# Patient Record
Sex: FEMALE | Race: WHITE | Employment: FULL TIME | URBAN - METROPOLITAN AREA
[De-identification: names, ages, dates, MRNs, and addresses within clinical notes are randomized per-mention and may not be internally consistent; named-entity substitution may affect disease eponyms.]

---

## 2018-02-05 ENCOUNTER — OFFICE VISIT (OUTPATIENT)
Dept: INTERNAL MEDICINE CLINIC | Age: 38
End: 2018-02-05

## 2018-02-05 VITALS
RESPIRATION RATE: 18 BRPM | OXYGEN SATURATION: 98 % | TEMPERATURE: 99.9 F | BODY MASS INDEX: 28.25 KG/M2 | SYSTOLIC BLOOD PRESSURE: 158 MMHG | WEIGHT: 180 LBS | HEIGHT: 67 IN | DIASTOLIC BLOOD PRESSURE: 108 MMHG | HEART RATE: 118 BPM

## 2018-02-05 DIAGNOSIS — F43.21 GRIEF: ICD-10-CM

## 2018-02-05 DIAGNOSIS — J06.9 VIRAL UPPER RESPIRATORY TRACT INFECTION: Primary | ICD-10-CM

## 2018-02-05 DIAGNOSIS — R50.9 FEVER, UNSPECIFIED FEVER CAUSE: ICD-10-CM

## 2018-02-05 LAB
FLUAV+FLUBV AG NOSE QL IA.RAPID: NEGATIVE POS/NEG
FLUAV+FLUBV AG NOSE QL IA.RAPID: NEGATIVE POS/NEG
VALID INTERNAL CONTROL?: YES

## 2018-02-05 RX ORDER — ALPRAZOLAM 0.25 MG/1
0.25 TABLET ORAL
Qty: 7 TAB | Refills: 0 | Status: SHIPPED | OUTPATIENT
Start: 2018-02-05 | End: 2018-05-15

## 2018-02-05 NOTE — PROGRESS NOTES
Kin Clemons is a 44 y.o. female who was seen in clinic today (2/5/2018). Assessment & Plan:   Diagnoses and all orders for this visit:    1. Viral upper respiratory tract infection- discussed diagnosis & treatment options, flu negative, discussed allergic vs viral vs bacterial etiologies and at this time favor a viral etiology, reviewed the importance of avoiding unnecessary antibiotic therapy, reviewed which OTC medications to use and avoid, expected time course for resolution & red flags were reviewed with her to RTC or notify me. 2. Fever, unspecified fever cause  -     AMB POC DEMOND INFLUENZA A/B TEST  ---> NEGATIVE     3. Grief- new dx, reviewed treatment options with her, recommended to see a counselor, reviewed life style changes to help improve mood, reviewed role of daily vs prn meds, following changes were made today: will start w/ meds below, 1/2 to 1 tab daily, she will update me in 1 wk with changes in mood, changes in BP, and changes in HR.     -     ALPRAZolam (XANAX) 0.25 mg tablet; Take 1 Tab by mouth nightly as needed for Anxiety. Max Daily Amount: 0.25 mg. Follow-up Disposition:  Return in about 2 weeks (around 2/19/2018), or if symptoms worsen or fail to improve, for Regular follow up. Subjective:   Marialuisa was seen today for Cold Symptoms    URI Review  Isaak Stacy returns to clinic today to talk about: ALAN symptoms for 2 days ago, which are unchanged since that time. She reports sore throat, dry cough, bilateral ear fullness, fevers up to 101 degrees, hoarseness, rhinorrhea, sinus congestion. Treatments have included: ibuprofen which have been not very effective. Relevant PMH: No pertinent additional PMH. Patient reports sick contacts: yes - was around someone recently diagnosed w/ flu. .       Father passed away 4 days ago. Was battling lung cancer. She is having trouble sleeping: dreams/thoughts of the hospital, he was on the ventilator and in the hospital for > 1 month. She is worried about her mother, had been  44+ years. She feels heart is racing at times. No chest pain or SOB. No abd pain, n/v.  No orthopnea or PND or LE edema. Prior to Admission medications    Not on File          Allergies   Allergen Reactions    Levaquin [Levofloxacin] Anaphylaxis    Oxycodone Rash           ROS - per HPI        Objective:   Physical Exam   Constitutional: No distress. HENT:   Right Ear: Tympanic membrane is not erythematous and not bulging. No middle ear effusion. Left Ear: Tympanic membrane is not erythematous and not bulging. No middle ear effusion. Nose: Mucosal edema and rhinorrhea present. Right sinus exhibits no maxillary sinus tenderness and no frontal sinus tenderness. Left sinus exhibits no maxillary sinus tenderness and no frontal sinus tenderness. Mouth/Throat: Uvula is midline and mucous membranes are normal. Posterior oropharyngeal erythema present. No oropharyngeal exudate. Eyes: Conjunctivae are normal. No scleral icterus. Neck: Neck supple. Cardiovascular: Regular rhythm and normal heart sounds. Tachycardia present. No murmur heard. Pulmonary/Chest: Effort normal and breath sounds normal. She has no wheezes. She has no rales. Lymphadenopathy:     She has no cervical adenopathy. Visit Vitals    BP (!) 158/108    Pulse (!) 118    Temp 99.9 °F (37.7 °C) (Oral)    Resp 18    Ht 5' 6.5\" (1.689 m)    Wt 180 lb (81.6 kg)    SpO2 98%    BMI 28.62 kg/m2         Disclaimer:  Advised her to call back or return to office if symptoms worsen/change/persist.  Discussed expected course/resolution/complications of diagnosis in detail with patient. Medication risks/benefits/costs/interactions/alternatives discussed with patient. She was given an after visit summary which includes diagnoses, current medications, & vitals. She expressed understanding with the diagnosis and plan.       Aspects of this note may have been generated using voice recognition software. Despite editing, there may be some syntax errors.        Divya Holley MD

## 2018-02-05 NOTE — PROGRESS NOTES
Fever 101 last night, sneezing, coughing. Took ibuprofen last night, not today. Father passed away 4 days ago. Having trouble sleeping. Has positive flu exposure. Flushed.

## 2018-02-05 NOTE — MR AVS SNAPSHOT
[address] [address] 
[phone] Patient: Hari Wiggins MRN: [id number] Visit Information Date & Time Provider Department Dept. Phone Encounter #  
 2/5/2018  3:15 PM Edyta Blanc, [facility] [phone] [id number] Follow-up Instructions Return in about 2 weeks (around 2/19/2018), or if symptoms worsen or fail to improve, for Regular follow up. Upcoming Health Maintenance Date Due  
 PAP AKA CERVICAL CYTOLOGY 1/10/2017 DTaP/Tdap/Td series (1 - Tdap) 9/29/2018* *Topic was postponed. The date shown is not the original due date. Allergies as of 2/5/2018  Review Complete On: 2/5/2018 By: Beba Huang RN Severity Noted Reaction Type Reactions Levaquin [Levofloxacin] High 07/10/2012    Anaphylaxis Oxycodone  11/04/2014    Rash Current Immunizations  Reviewed on 2/5/2018 Name Date PPD 1/1/2010 TD Vaccine 1/1/2010 Reviewed by Beba Huang RN on 2/5/2018 at  3:14 PM  
You Were Diagnosed With   
  
 Codes Comments Viral upper respiratory tract infection    -  Primary ICD-10-CM: J06.9, B97.89 ICD-9-CM: 465.9 Fever, unspecified fever cause     ICD-10-CM: R50.9 ICD-9-CM: 780.60 Grief     ICD-10-CM: N79.44 ICD-9-CM: 309.0 Vitals BP Pulse Temp Resp Height(growth percentile) Weight(growth percentile) (!) 158/108 (!) 118 99.9 °F (37.7 °C) (Oral) 18 5' 6.5\" (1.689 m) 180 lb (81.6 kg) SpO2 BMI OB Status Smoking Status 98% 28.62 kg/m2 Having regular periods Never Smoker Vitals History BMI and BSA Data Body Mass Index Body Surface Area  
 28.62 kg/m 2 1.96 m 2 Preferred Pharmacy Pharmacy Name Phone [id number]- [place], [address] [place] [address] [phone] Your Updated Medication List  
  
   
 This list is accurate as of: 2/5/18  3:55 PM.  Always use your most recent med list.  
  
  
  
  
 ALPRAZolam 0.25 mg tablet Commonly known as:  Bree Hams Take 1 Tab by mouth nightly as needed for Anxiety. Max Daily Amount: 0.25 mg.  
  
  
  
  
Prescriptions Printed Refills ALPRAZolam (XANAX) 0.25 mg tablet 0 Sig: Take 1 Tab by mouth nightly as needed for Anxiety. Max Daily Amount: 0.25 mg.  
 Class: Print Route: Oral  
  
We Performed the Following AMB POC DEMOND INFLUENZA A/B TEST [98457 CPT(R)] Follow-up Instructions Return in about 2 weeks (around 2/19/2018), or if symptoms worsen or fail to improve, for Regular follow up. Patient Instructions Grief (Actual/Anticipated): Care Instructions Your Care Instructions Grief is your emotional reaction to a major loss. The words \"sorrow\" and \"heartache\" often are used to describe feelings of grief. You feel grief when you lose a beloved person, pet, place, or thing. It is also natural to feel grief when you lose a valued way of life, such as a job, marriage, or good health. You may begin to grieve before a loss occurs. You may grieve for a loved one who is sick and dying. Children and adults often feel the pain of loss before a big move or divorce. This type of grief helps you get ready for a loss. Grief is different for each person. There is no \"normal\" or \"expected\" period of time for grieving. Some people adjust to their loss within a couple of months. Others may take 2 years or longer, especially if their lives were changed a lot or if the loss was sudden and shocking. Grieving can cause problems such as headaches, loss of appetite, and trouble with thinking or sleeping. You may withdraw from friends and family and behave in ways that are unusual for you. Grief may cause you to question your beliefs and views about life. Grief is natural and does not require medical treatment.  But if you have trouble sleeping, it may help to take sleeping pills for a short time. It may help to talk with people who have been through or are going through similar losses. You may also want to talk to a counselor about your feelings. Talking about your loss, sharing your cares and concerns, and getting support from others are important parts of healthy grieving. Follow-up care is a key part of your treatment and safety. Be sure to make and go to all appointments, and call your doctor if you are having problems. It's also a good idea to know your test results and keep a list of the medicines you take. How can you care for yourself at home? · Get enough sleep. Your mind helps make sense of your life while you sleep. Missing sleep can lead to illness and make it harder for you to deal with your grief. · Eat healthy foods. Try to avoid eating only foods that give you comfort. Ask someone to join you for a meal if you do not like eating alone. Consider taking a multivitamin every day. · Get some exercise every day. Even a walk can help you deal with your grief. Other exercises, such as yoga, can also help you manage stress. · Comfort yourself. Take time to look at photos or use special items that make you feel better. · Stay involved in your life. Do not withdraw from the activities you enjoy. People you know at work, Jainism, clubs, or other groups can help you get through your period of grief. · Think about joining a support group to help you deal with your grief. There are many support groups to help people recover from grief. When should you call for help? Call 911 anytime you think you may need emergency care. For example, call if: 
? · You feel you cannot stop from hurting yourself or someone else. ? Watch closely for changes in your health, and be sure to contact your doctor if: 
? · You think you may be depressed. ? · You do not get better as expected. Where can you learn more? Go to http://stephen-ned.info/. Enter H249 in the search box to learn more about \"Grief (Actual/Anticipated): Care Instructions. \" Current as of: September 24, 2016 Content Version: 11.4 © 6497-3398 [employer]. Care instructions adapted under license by [employer] (which disclaims liability or warranty for this information). If you have questions about a medical condition or this instruction, always ask your healthcare professional. [address] [address] any warranty or liability for your use of this information. Upper Respiratory Infection (Cold): Care Instructions Your Care Instructions An upper respiratory infection, or URI, is an infection of the nose, sinuses, or throat. URIs are spread by coughs, sneezes, and direct contact. The common cold is the most frequent kind of URI. The flu and sinus infections are other kinds of URIs. Almost all URIs are caused by viruses. Antibiotics won't cure them. But you can treat most infections with home care. This may include drinking lots of fluids and taking over-the-counter pain medicine. You will probably feel better in 4 to 10 days. The doctor has checked you carefully, but problems can develop later. If you notice any problems or new symptoms, get medical treatment right away. Follow-up care is a key part of your treatment and safety. Be sure to make and go to all appointments, and call your doctor if you are having problems. It's also a good idea to know your test results and keep a list of the medicines you take. How can you care for yourself at home? · To prevent dehydration, drink plenty of fluids, enough so that your urine is light yellow or clear like water. Choose water and other caffeine-free clear liquids until you feel better. If you have kidney, heart, or liver disease and have to limit fluids, talk with your doctor before you increase the amount of fluids you drink. · Take an over-the-counter pain medicine, such as acetaminophen (Tylenol), ibuprofen (Advil, Motrin), or naproxen (Aleve). Read and follow all instructions on the label. · Before you use cough and cold medicines, check the label. These medicines may not be safe for young children or for people with certain health problems. · Be careful when taking over-the-counter cold or flu medicines and Tylenol at the same time. Many of these medicines have acetaminophen, which is Tylenol. Read the labels to make sure that you are not taking more than the recommended dose. Too much acetaminophen (Tylenol) can be harmful. · Get plenty of rest. 
· Do not smoke or allow others to smoke around you. If you need help quitting, talk to your doctor about stop-smoking programs and medicines. These can increase your chances of quitting for good. When should you call for help? Call 911 anytime you think you may need emergency care. For example, call if: 
? · You have severe trouble breathing. ?Call your doctor now or seek immediate medical care if: 
? · You seem to be getting much sicker. ? · You have new or worse trouble breathing. ? · You have a new or higher fever. ? · You have a new rash. ? Watch closely for changes in your health, and be sure to contact your doctor if: 
? · You have a new symptom, such as a sore throat, an earache, or sinus pain. ? · You cough more deeply or more often, especially if you notice more mucus or a change in the color of your mucus. ? · You do not get better as expected. Where can you learn more? Go to http://stephen-ned.info/. Enter Z516 in the search box to learn more about \"Upper Respiratory Infection (Cold): Care Instructions. \" Current as of: May 12, 2017 Content Version: 11.4 © 5824-8241 Healthwise, [employer]  Care instructions adapted under license by [employer] (which disclaims liability or warranty for this information). If you have questions about a medical condition or this instruction, always ask your healthcare professional. [address] any warranty or liability for your use of this information. Introducing [facility] SERVICES! Dear Calos Sorensen: Thank you for requesting a [employer] account. Our records indicate that you have previously registered for a [employer] account but its currently inactive. Please call our [employer] support line at [phone]. Additional Information If you have questions, please visit the Frequently Asked Questions section of the [employer] website at [email or web address] Remember, [employer] is NOT to be used for urgent needs. For medical emergencies, dial 911. Now available from your iPhone and Android! Please provide this summary of care documentation to your next provider. Your primary care clinician is listed as Kishor Segura. If you have any questions after today's visit, please call [phone].

## 2018-02-05 NOTE — PATIENT INSTRUCTIONS
Grief (Actual/Anticipated): Care Instructions  Your Care Instructions    Grief is your emotional reaction to a major loss. The words \"sorrow\" and \"heartache\" often are used to describe feelings of grief. You feel grief when you lose a beloved person, pet, place, or thing. It is also natural to feel grief when you lose a valued way of life, such as a job, marriage, or good health. You may begin to grieve before a loss occurs. You may grieve for a loved one who is sick and dying. Children and adults often feel the pain of loss before a big move or divorce. This type of grief helps you get ready for a loss. Grief is different for each person. There is no \"normal\" or \"expected\" period of time for grieving. Some people adjust to their loss within a couple of months. Others may take 2 years or longer, especially if their lives were changed a lot or if the loss was sudden and shocking. Grieving can cause problems such as headaches, loss of appetite, and trouble with thinking or sleeping. You may withdraw from friends and family and behave in ways that are unusual for you. Grief may cause you to question your beliefs and views about life. Grief is natural and does not require medical treatment. But if you have trouble sleeping, it may help to take sleeping pills for a short time. It may help to talk with people who have been through or are going through similar losses. You may also want to talk to a counselor about your feelings. Talking about your loss, sharing your cares and concerns, and getting support from others are important parts of healthy grieving. Follow-up care is a key part of your treatment and safety. Be sure to make and go to all appointments, and call your doctor if you are having problems. It's also a good idea to know your test results and keep a list of the medicines you take. How can you care for yourself at home? · Get enough sleep. Your mind helps make sense of your life while you sleep. Missing sleep can lead to illness and make it harder for you to deal with your grief. · Eat healthy foods. Try to avoid eating only foods that give you comfort. Ask someone to join you for a meal if you do not like eating alone. Consider taking a multivitamin every day. · Get some exercise every day. Even a walk can help you deal with your grief. Other exercises, such as yoga, can also help you manage stress. · Comfort yourself. Take time to look at photos or use special items that make you feel better. · Stay involved in your life. Do not withdraw from the activities you enjoy. People you know at work, Taoist, clubs, or other groups can help you get through your period of grief. · Think about joining a support group to help you deal with your grief. There are many support groups to help people recover from grief. When should you call for help? Call 911 anytime you think you may need emergency care. For example, call if:  ? · You feel you cannot stop from hurting yourself or someone else. ? Watch closely for changes in your health, and be sure to contact your doctor if:  ? · You think you may be depressed. ? · You do not get better as expected. Where can you learn more? Go to http://stephen-ned.info/. Enter H249 in the search box to learn more about \"Grief (Actual/Anticipated): Care Instructions. \"  Current as of: September 24, 2016  Content Version: 11.4  © 4996-7445 [employer]. Care instructions adapted under license by [employer] (which disclaims liability or warranty for this information). If you have questions about a medical condition or this instruction, always ask your healthcare professional. [address] [address] any warranty or liability for your use of this information.        Upper Respiratory Infection (Cold): Care Instructions  Your Care Instructions    An upper respiratory infection, or URI, is an infection of the nose, sinuses, or throat. URIs are spread by coughs, sneezes, and direct contact. The common cold is the most frequent kind of URI. The flu and sinus infections are other kinds of URIs. Almost all URIs are caused by viruses. Antibiotics won't cure them. But you can treat most infections with home care. This may include drinking lots of fluids and taking over-the-counter pain medicine. You will probably feel better in 4 to 10 days. The doctor has checked you carefully, but problems can develop later. If you notice any problems or new symptoms, get medical treatment right away. Follow-up care is a key part of your treatment and safety. Be sure to make and go to all appointments, and call your doctor if you are having problems. It's also a good idea to know your test results and keep a list of the medicines you take. How can you care for yourself at home? · To prevent dehydration, drink plenty of fluids, enough so that your urine is light yellow or clear like water. Choose water and other caffeine-free clear liquids until you feel better. If you have kidney, heart, or liver disease and have to limit fluids, talk with your doctor before you increase the amount of fluids you drink. · Take an over-the-counter pain medicine, such as acetaminophen (Tylenol), ibuprofen (Advil, Motrin), or naproxen (Aleve). Read and follow all instructions on the label. · Before you use cough and cold medicines, check the label. These medicines may not be safe for young children or for people with certain health problems. · Be careful when taking over-the-counter cold or flu medicines and Tylenol at the same time. Many of these medicines have acetaminophen, which is Tylenol. Read the labels to make sure that you are not taking more than the recommended dose. Too much acetaminophen (Tylenol) can be harmful. · Get plenty of rest.  · Do not smoke or allow others to smoke around you.  If you need help quitting, talk to your doctor about stop-smoking programs and medicines. These can increase your chances of quitting for good. When should you call for help? Call 911 anytime you think you may need emergency care. For example, call if:  ? · You have severe trouble breathing. ?Call your doctor now or seek immediate medical care if:  ? · You seem to be getting much sicker. ? · You have new or worse trouble breathing. ? · You have a new or higher fever. ? · You have a new rash. ? Watch closely for changes in your health, and be sure to contact your doctor if:  ? · You have a new symptom, such as a sore throat, an earache, or sinus pain. ? · You cough more deeply or more often, especially if you notice more mucus or a change in the color of your mucus. ? · You do not get better as expected. Where can you learn more? Go to http://stephen-ned.info/. Enter X389 in the search box to learn more about \"Upper Respiratory Infection (Cold): Care Instructions. \"  Current as of: May 12, 2017  Content Version: 11.4  © 6538-1091 Healthwise, [employer]. Care instructions adapted under license by [employer] (which disclaims liability or warranty for this information). If you have questions about a medical condition or this instruction, always ask your healthcare professional. [address] any warranty or liability for your use of this information.

## 2018-03-30 ENCOUNTER — OFFICE VISIT (OUTPATIENT)
Dept: SURGERY | Age: 38
End: 2018-03-30

## 2018-03-30 ENCOUNTER — DOCUMENTATION ONLY (OUTPATIENT)
Dept: SURGERY | Age: 38
End: 2018-03-30

## 2018-03-30 VITALS
HEIGHT: 66 IN | HEART RATE: 119 BPM | BODY MASS INDEX: 29.41 KG/M2 | SYSTOLIC BLOOD PRESSURE: 154 MMHG | WEIGHT: 183 LBS | DIASTOLIC BLOOD PRESSURE: 107 MMHG

## 2018-03-30 DIAGNOSIS — D05.11 DUCTAL CARCINOMA IN SITU (DCIS) OF RIGHT BREAST: Primary | ICD-10-CM

## 2018-03-30 NOTE — PROGRESS NOTES
Patient did not bring a copy of her mammogram films to her appointment. She will have a breast MRI next week. I advised her to bring a copy of the mammogram films to the breast MRI when she has it done. She understood.

## 2018-03-30 NOTE — PATIENT INSTRUCTIONS
Breast Cancer: Care Instructions  Your Care Instructions    Breast cancer occurs when abnormal cells grow out of control in the breast. These cancer cells can spread within the breast, to nearby lymph nodes and other tissues, and to other parts of the body. Being treated for cancer can weaken your body, and you may feel very tired. Get the rest your body needs so you can feel better. Finding out that you have cancer is scary. You may feel many emotions and may need some help coping. Seek out family, friends, and counselors for support. You also can do things at home to make yourself feel better while you go through treatment. Call the [employer] (2-512.327.1530) or visit its website at [employer] for more information. Follow-up care is a key part of your treatment and safety. Be sure to make and go to all appointments, and call your doctor if you are having problems. It's also a good idea to know your test results and keep a list of the medicines you take. How can you care for yourself at home? · Take your medicines exactly as prescribed. Call your doctor if you think you are having a problem with your medicine. You may get medicine for nausea and vomiting if you have these side effects. · Follow your doctor's instructions to relieve pain. Pain from cancer and surgery can almost always be controlled. Use pain medicine when you first notice pain, before it becomes severe. · Eat healthy food. If you do not feel like eating, try to eat food that has protein and extra calories to keep up your strength and prevent weight loss. Drink liquid meal replacements for extra calories and protein. Try to eat your main meal early. · Get some physical activity every day, but do not get too tired. Keep doing the hobbies you enjoy as your energy allows. · Do not smoke. Smoking can make your cancer worse. If you need help quitting, talk to your doctor about stop-smoking programs and medicines.  These can increase your chances of quitting for good. · Take steps to control your stress and workload. Learn relaxation techniques. ¨ Share your feelings. Stress and tension affect our emotions. By expressing your feelings to others, you may be able to understand and cope with them. ¨ Consider joining a support group. Talking about a problem with your spouse, a good friend, or other people with similar problems is a good way to reduce tension and stress. ¨ Express yourself through art. Try writing, crafts, dance, or art to relieve stress. Some dance, writing, or art groups may be available just for people who have cancer. ¨ Be kind to your body and mind. Getting enough sleep, eating a healthy diet, and taking time to do things you enjoy can contribute to an overall feeling of balance in your life and can help reduce stress. ¨ Get help if you need it. Discuss your concerns with your doctor or counselor. · If you are vomiting or have diarrhea:  ¨ Drink plenty of fluids (enough so that your urine is light yellow or clear like water) to prevent dehydration. Choose water and other caffeine-free clear liquids. If you have kidney, heart, or liver disease and have to limit fluids, talk with your doctor before you increase the amount of fluids you drink. ¨ When you are able to eat, try clear soups, mild foods, and liquids until all symptoms are gone for 12 to 48 hours. Other good choices include dry toast, crackers, cooked cereal, and gelatin dessert, such as Jell-O.  · If you have not already done so, prepare a list of advance directives. Advance directives are instructions to your doctor and family members about what kind of care you want if you become unable to speak or express yourself. When should you call for help? Call 911 anytime you think you may need emergency care. For example, call if:  ? · You passed out (lost consciousness). ?Call your doctor now or seek immediate medical care if:  ? · You have a fever. ? · You have abnormal bleeding. ? · You think you have an infection. ? · You have new or worse pain. ? · You have new symptoms, such as a cough, belly pain, vomiting, diarrhea, or a rash. ? Watch closely for changes in your health, and be sure to contact your doctor if:  ? · You are much more tired than usual.   ? · You have swollen glands in your armpits, groin, or neck. ? · You do not get better as expected. Where can you learn more? Go to http://stephen-ned.info/. Enter V321 in the search box to learn more about \"Breast Cancer: Care Instructions. \"  Current as of: May 12, 2017  Content Version: 11.4  © 9484-0591 [employer]. Care instructions adapted under license by [employer] (which disclaims liability or warranty for this information). If you have questions about a medical condition or this instruction, always ask your healthcare professional. [address] any warranty or liability for your use of this information.

## 2018-03-30 NOTE — MR AVS SNAPSHOT
[address] [facility] 
[phone] Patient: Andrea Alcocer MRN: [id number] Visit Information Date & Time Provider Department Dept. Phone Encounter #  
 3/30/2018  1:00 PM Lamberto Randall  [address] [id number] Your Appointments 3/30/2018  1:00 PM  
BREAST TALK 30 with MD [address] [facility] Appt Note: dcis 2 site right/ imaging at [facility]/ dr chaudhary/ cp$ 3-22-18 [phone]  
[address] [place] [address] Upcoming Health Maintenance Date Due DTaP/Tdap/Td series (1 - Tdap) 9/29/2018* PAP AKA CERVICAL CYTOLOGY 2/10/2020 *Topic was postponed. The date shown is not the original due date. Allergies as of 3/30/2018  Review Complete On: 2/5/2018 By: Maci Osborn MD  
  
 Severity Noted Reaction Type Reactions Levaquin [Levofloxacin] High 07/10/2012    Anaphylaxis Oxycodone  11/04/2014    Rash Current Immunizations  Reviewed on 2/5/2018 Name Date PPD 1/1/2010 TD Vaccine 1/1/2010 Not reviewed this visit Vitals OB Status Smoking Status Having regular periods Never Smoker Preferred Pharmacy Pharmacy Name Phone [phone] [phone] [phone] Your Updated Medication List  
  
   
This list is accurate as of 3/30/18 12:50 PM.  Always use your most recent med list.  
  
  
  
  
 ALPRAZolam 0.25 mg tablet Commonly known as:  Charlee Jordan Take 1 Tab by mouth nightly as needed for Anxiety. Max Daily Amount: 0.25 mg. Introducing [facility]!    
 Kenya Stewart introduces [employer] patient portal. Now you can access parts of your medical record, email your doctor's office, and request medication refills online. 1. In your internet browser, go to [email or web address] [email or web address] 2. Click on the First Time User? Click Here link in the Sign In box. You will see the New Member Sign Up page. 3. Enter your [employer] Access Code exactly as it appears below. You will not need to use this code after youve completed the sign-up process. If you do not sign up before the expiration date, you must request a new code. · [employer] Access Code: [email or web address] Expires: 6/28/2018 12:32 PM 
 
4. Enter the last four digits of your Social Security Number (xxxx) and Date of Birth (mm/dd/yyyy) as indicated and click Submit. You will be taken to the next sign-up page. 5. Create a [employer]jogo ID. This will be your [employer] login ID and cannot be changed, so think of one that is secure and easy to remember. 6. Create a [employer] password. You can change your password at any time. 7. Enter your Password Reset Question and Answer. This can be used at a later time if you forget your password. 8. Enter your e-mail address. You will receive e-mail notification when new information is available in [address] 9. Click Sign Up. You can now view and download portions of your medical record. 10. Click the Download Summary menu link to download a portable copy of your medical information. If you have questions, please visit the Frequently Asked Questions section of the [employer] website. Remember, [employer] is NOT to be used for urgent needs. For medical emergencies, dial 911. Now available from your iPhone and Android! Please provide this summary of care documentation to your next provider. Your primary care clinician is listed as Sanjiv Royal. If you have any questions after today's visit, please call [phone].

## 2018-03-30 NOTE — PROGRESS NOTES
HISTORY OF PRESENT ILLNESS  Sofia Reed is a 44 y.o. female. HPI  NEW patient consult referred by Dr. Carmen Estevez for 2 site RIGHT breast DCIS. Had been feeling pain to her RIGHT breast. No pain today. Does not palpate a breast lump. Cancer found on screening mammogram.  Denies skin changes or nipple discharge/retraction. Denies FH of breast or ovarian cancer. Mammogram/ultrasound, VWC, BIRADS 4C multiple irregular hypoechoic masses from 9:00 to 10:00   Largest 2.9 cm  10:00 4 cfn and 10 cfn DCIS high grade with comedonecrosis. Er/Pr negative  Past Medical History:   Diagnosis Date    Calculus of kidney     NVD (normal vaginal delivery) 2017    Overweight(278.02) 2013     No past surgical history on file. Social History     Social History    Marital status:      Spouse name: N/A    Number of children: N/A    Years of education: N/A     Occupational History    Not on file. Social History Main Topics    Smoking status: Never Smoker    Smokeless tobacco: Never Used    Alcohol use 1.0 oz/week     2 Glasses of wine per week    Drug use: No    Sexual activity: Yes     Partners: Male     Birth control/ protection: None, Condom     Other Topics Concern    Not on file     Social History Narrative     OB History      Para Term  AB Living    2 2    2    SAB TAB Ectopic Molar Multiple Live Births                 Obstetric Comments    Menarche:  11. LMP:3/8/18 # of Children:  2. Age at Delivery of First Child: 31 .   Hysterectomy/oophorectomy:  NO/NO. Breast Bx:  yes. Hx of Breast Feeding:  yes. BCP:  NO. Hormone therapy:  no.           Current Outpatient Prescriptions:     ALPRAZolam (XANAX) 0.25 mg tablet, Take 1 Tab by mouth nightly as needed for Anxiety. Max Daily Amount: 0.25 mg., Disp: 7 Tab, Rfl: 0  Allergies   Allergen Reactions    Levaquin [Levofloxacin] Anaphylaxis    Oxycodone Rash         Review of Systems   Constitutional: Negative. HENT: Negative. Eyes: Negative. Respiratory: Negative. Cardiovascular: Negative. Gastrointestinal: Negative. Genitourinary: Negative. Musculoskeletal: Negative. Skin: Negative. Neurological: Negative. Endo/Heme/Allergies: Negative. Psychiatric/Behavioral: Negative. Physical Exam   Pulmonary/Chest: Right breast exhibits mass. Right breast exhibits no inverted nipple, no nipple discharge, no skin change and no tenderness. Left breast exhibits no inverted nipple, no mass, no nipple discharge, no skin change and no tenderness. Breasts are symmetrical.       Lymphadenopathy:     She has no cervical adenopathy. She has no axillary adenopathy. Nursing note and vitals reviewed. ASSESSMENT and PLAN    ICD-10-CM ICD-9-CM    1. Ductal carcinoma in situ (DCIS) of right breast D05.11 233.0 MRI BREAST BI W WO CONT     45 yo female with multifocal dcis er/pr negative, high grade with comedonecrosis. She is here with her . I have reviewed the imaging and pathology with her and she was given copies of these reports. 90 minutes were spent face-to-face with the patient during this encounter and 90% of that time was spent on counseling and coordination of care. 1. Discussed lumpectomy and radiation vs mastectomy. Discussed reconstruction. MRI ordered to see if patient is a candidate for a lumpectomy. Will likely need mastectomy with reconstruction. 2. Discussed sentinel lymph node biopsy. 3. Discussed external beam radiation. 4. Discussed hormone therapy. 5. Discussed the possibility of chemotherapy. 6. Discussed genetic testing. Patient wants to think about this. Will schedule mri and refer to plastic surgery. She wants things done at [facility].

## 2018-03-30 NOTE — LETTER
3/30/2018 4:49 PM 
 
Patient:  Iza Holguin YOB: 1980 Date of Visit: 3/30/2018 Dear Ana Laura Hilton MD 
[address] [facility] [address] VIA In Basket 
 : Thank you for referring Ms. Iza Holguin to me for evaluation/treatment. Below are the relevant portions of my assessment and plan of care. HISTORY OF PRESENT ILLNESS Iza Holguin is a 44 y.o. female. HPI  NEW patient consult referred by Dr. Iona Long for 2 site RIGHT breast DCIS. Had been feeling pain to her RIGHT breast. No pain today. Does not palpate a breast lump. Cancer found on screening mammogram.  Denies skin changes or nipple discharge/retraction. Denies FH of breast or ovarian cancer. Mammogram/ultrasound, VWC, BIRADS 4C multiple irregular hypoechoic masses from 9:00 to 10:00 Largest 2.9 cm 10:00 4 cfn and 10 cfn DCIS high grade with comedonecrosis. Er/Pr negative Past Medical History:  
Diagnosis Date  Calculus of kidney   NVD (normal vaginal delivery) 2017  Overweight(278.02) 2013 No past surgical history on file. Social History Social History  Marital status:  Spouse name: N/A  
 Number of children: N/A  
 Years of education: N/A Occupational History  Not on file. Social History Main Topics  Smoking status: Never Smoker  Smokeless tobacco: Never Used  Alcohol use 1.0 oz/week 2 Glasses of wine per week  Drug use: No  
 Sexual activity: Yes  
  Partners: Male Birth control/ protection: None, Condom Other Topics Concern  Not on file Social History Narrative OB History  Para Term  AB Living 2 2    2 SAB TAB Ectopic Molar Multiple Live Births Obstetric Comments Menarche:  14. LMP:3/8/18 # of Children:  2. Age at Delivery of First Child: 31 .   Hysterectomy/oophorectomy:  NO/NO. Breast Bx:  yes.   Hx of Breast Feeding:  yes. BCP:  NO. Hormone therapy:  no.   
  
 
 
Current Outpatient Prescriptions:  
  ALPRAZolam (XANAX) 0.25 mg tablet, Take 1 Tab by mouth nightly as needed for Anxiety. Max Daily Amount: 0.25 mg., Disp: 7 Tab, Rfl: 0 Allergies Allergen Reactions  Levaquin [Levofloxacin] Anaphylaxis  Oxycodone Rash Review of Systems Constitutional: Negative. HENT: Negative. Eyes: Negative. Respiratory: Negative. Cardiovascular: Negative. Gastrointestinal: Negative. Genitourinary: Negative. Musculoskeletal: Negative. Skin: Negative. Neurological: Negative. Endo/Heme/Allergies: Negative. Psychiatric/Behavioral: Negative. Physical Exam  
Pulmonary/Chest: Right breast exhibits mass. Right breast exhibits no inverted nipple, no nipple discharge, no skin change and no tenderness. Left breast exhibits no inverted nipple, no mass, no nipple discharge, no skin change and no tenderness. Breasts are symmetrical.  
 
 
Lymphadenopathy:  
  She has no cervical adenopathy. She has no axillary adenopathy. Nursing note and vitals reviewed. ASSESSMENT and PLAN 
  ICD-10-CM ICD-9-CM 1. Ductal carcinoma in situ (DCIS) of right breast D05.11 233.0 MRI BREAST BI W WO CONT  
 
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 She is here with her . I have reviewed the imaging and pathology with her and she was given copies of these reports. 90 minutes were spent face-to-face with the patient during this encounter and 90% of that time was spent on counseling and coordination of care. 1. Discussed lumpectomy and radiation vs mastectomy. Discussed reconstruction. MRI ordered to see if patient is a candidate for a lumpectomy. Will likely need mastectomy with reconstruction. 2. Discussed sentinel lymph node biopsy. 3. Discussed external beam radiation. 4. Discussed hormone therapy. 5. Discussed the possibility of chemotherapy. 6. Discussed genetic testing. Patient wants to think about this. Will schedule mri and refer to plastic surgery. She wants things done at [facility]. If you have questions, please do not hesitate to call me. I look forward to following Ms. Salazar along with you. Sincerely, Blair Chaves MD

## 2018-03-30 NOTE — COMMUNICATION BODY
HISTORY OF PRESENT ILLNESS  Deshaun Bhatti is a 44 y.o. female. HPI  NEW patient consult referred by Dr. Willow Salcedo for 2 site RIGHT breast DCIS. Had been feeling pain to her RIGHT breast. No pain today. Does not palpate a breast lump. Cancer found on screening mammogram.  Denies skin changes or nipple discharge/retraction. Denies FH of breast or ovarian cancer. Mammogram/ultrasound, VWC, BIRADS 4C multiple irregular hypoechoic masses from 9:00 to 10:00   Largest 2.9 cm  10:00 4 cfn and 10 cfn DCIS high grade with comedonecrosis. Er/Pr negative  Past Medical History:   Diagnosis Date    Calculus of kidney     NVD (normal vaginal delivery) 2017    Overweight(278.02) 2013     No past surgical history on file. Social History     Social History    Marital status:      Spouse name: N/A    Number of children: N/A    Years of education: N/A     Occupational History    Not on file. Social History Main Topics    Smoking status: Never Smoker    Smokeless tobacco: Never Used    Alcohol use 1.0 oz/week     2 Glasses of wine per week    Drug use: No    Sexual activity: Yes     Partners: Male     Birth control/ protection: None, Condom     Other Topics Concern    Not on file     Social History Narrative     OB History      Para Term  AB Living    2 2    2    SAB TAB Ectopic Molar Multiple Live Births                 Obstetric Comments    Menarche:  11. LMP:3/8/18 # of Children:  2. Age at Delivery of First Child: 31 .   Hysterectomy/oophorectomy:  NO/NO. Breast Bx:  yes. Hx of Breast Feeding:  yes. BCP:  NO. Hormone therapy:  no.           Current Outpatient Prescriptions:     ALPRAZolam (XANAX) 0.25 mg tablet, Take 1 Tab by mouth nightly as needed for Anxiety. Max Daily Amount: 0.25 mg., Disp: 7 Tab, Rfl: 0  Allergies   Allergen Reactions    Levaquin [Levofloxacin] Anaphylaxis    Oxycodone Rash         Review of Systems   Constitutional: Negative. HENT: Negative. Eyes: Negative. Respiratory: Negative. Cardiovascular: Negative. Gastrointestinal: Negative. Genitourinary: Negative. Musculoskeletal: Negative. Skin: Negative. Neurological: Negative. Endo/Heme/Allergies: Negative. Psychiatric/Behavioral: Negative. Physical Exam   Pulmonary/Chest: Right breast exhibits mass. Right breast exhibits no inverted nipple, no nipple discharge, no skin change and no tenderness. Left breast exhibits no inverted nipple, no mass, no nipple discharge, no skin change and no tenderness. Breasts are symmetrical.       Lymphadenopathy:     She has no cervical adenopathy. She has no axillary adenopathy. Nursing note and vitals reviewed. ASSESSMENT and PLAN    ICD-10-CM ICD-9-CM    1. Ductal carcinoma in situ (DCIS) of right breast D05.11 233.0 MRI BREAST BI W WO CONT     43 yo female with multifocal dcis er/pr negative, high grade with comedonecrosis. She is here with her . I have reviewed the imaging and pathology with her and she was given copies of these reports. 90 minutes were spent face-to-face with the patient during this encounter and 90% of that time was spent on counseling and coordination of care. 1. Discussed lumpectomy and radiation vs mastectomy. Discussed reconstruction. MRI ordered to see if patient is a candidate for a lumpectomy. Will likely need mastectomy with reconstruction. 2. Discussed sentinel lymph node biopsy. 3. Discussed external beam radiation. 4. Discussed hormone therapy. 5. Discussed the possibility of chemotherapy. 6. Discussed genetic testing. Patient wants to think about this. Will schedule mri and refer to plastic surgery. She wants things done at [facility].

## 2018-04-02 ENCOUNTER — TELEPHONE (OUTPATIENT)
Dept: SURGERY | Age: 38
End: 2018-04-02

## 2018-04-02 NOTE — TELEPHONE ENCOUNTER
Per Dr. Itz Smith, I faxed slide request to [facility] path to be sent to [facility] for 2nd opinion pathology read, per request from the patient's . Called Mr. Simeonfern Adele to notify him that I had done this.

## 2018-04-02 NOTE — TELEPHONE ENCOUNTER
----- Message from Main Chen MD sent at 4/2/2018  1:52 PM EDT -----  Regarding: RE: path 2nd opinion  [id number] [address]  ----- Message -----     From: Luis Alberto Patino RN     Sent: 4/2/2018   1:36 PM       To: Main Chen MD  Subject: path 2nd opinion                                 Patient's  interested in getting 2nd opinion pathology read on the biopsy which was done at [facility] (went to [facility] path). Should I request slides to be sent to our lab for 2nd opinion?   Thanks,  Mary Adkins

## 2018-04-03 DIAGNOSIS — D05.11 DUCTAL CARCINOMA IN SITU (DCIS) OF RIGHT BREAST: Primary | ICD-10-CM

## 2018-04-04 ENCOUNTER — HOSPITAL ENCOUNTER (OUTPATIENT)
Dept: LAB | Age: 38
Discharge: HOME | End: 2018-04-04

## 2018-04-04 NOTE — PROGRESS NOTES
I spoke w/Dolores, medical records w/[facility], she advised that she will attempt to find someone to \"push\" the images this afternoon. If unable to \"push\" this evening, will have the regular medical record person who handles this do so in the morning. I advised that Ms. Edinson Hale will be here @ 830 for MRI breast, supplied w/contact number of [phone].

## 2018-04-05 ENCOUNTER — HOSPITAL ENCOUNTER (OUTPATIENT)
Dept: MRI IMAGING | Age: 38
Discharge: HOME | End: 2018-04-05
Attending: SURGERY
Payer: COMMERCIAL

## 2018-04-05 DIAGNOSIS — D05.11 DUCTAL CARCINOMA IN SITU (DCIS) OF RIGHT BREAST: ICD-10-CM

## 2018-04-05 PROCEDURE — 77059 MRI BREAST BI W WO CONT: CPT

## 2018-04-05 PROCEDURE — A9585 GADOBUTROL INJECTION: HCPCS | Performed by: SURGERY

## 2018-04-05 PROCEDURE — 74011250636 HC RX REV CODE- 250/636: Performed by: SURGERY

## 2018-04-05 RX ADMIN — GADOBUTROL 9.5 ML: 604.72 INJECTION INTRAVENOUS at 09:20

## 2018-04-05 NOTE — PROGRESS NOTES
Nya Ozuna, [address] [facility], contacted in reference to [facility] that was completed this a.m. June linked all reports at this time.

## 2018-04-09 ENCOUNTER — TELEPHONE (OUTPATIENT)
Dept: SURGERY | Age: 38
End: 2018-04-09

## 2018-04-09 NOTE — TELEPHONE ENCOUNTER
I spoke with patient about breast mri. Needs mastectomy right breast. Left breast ok. Pathology review pending. I called dr. Andrzej Lo to move up her appointment.

## 2018-04-12 NOTE — TELEPHONE ENCOUNTER
I called Santos Buford Hamman and gave him the path review information. He is asking about sooner date with plastic surgeon. I called Bre Brambila and apparently Yary Mtz has not moved date sooner than original date of 4/25/17 because Dr. Elder Berg is out of the office this week.

## 2018-04-12 NOTE — TELEPHONE ENCOUNTER
----- Message from Natividad Dallas MD sent at 4/12/2018  1:15 PM EDT -----  Regarding: RE:  calling  Can you call him? We also read as high grade dcis. Same as outside. If he wants to talk to me I can call tomorrow. Tell him I am in operating room today. Plan still mastectomy on right with recon. Not sure when plastics appointment got moved up to with aboutanos. thanks  ----- Message -----     From: Sheridan Rutledge RN     Sent: 4/12/2018  11:57 AM       To: Natividad Dallas MD  Subject:  calling                                  [place],    Can you please call Mr. Jesi Angeles with the results of the pathology review? His phone # [phone].     Thank you,  Monik Silva

## 2018-04-13 NOTE — TELEPHONE ENCOUNTER
Dr Elder Berg appointment has been moved up to April 16, 2018 @ 2:45 pm    [facility] [place] [phone]    Patient was very excited to get this appointment.

## 2018-05-01 DIAGNOSIS — D05.11 DUCTAL CARCINOMA IN SITU OF RIGHT BREAST: Primary | ICD-10-CM

## 2018-05-03 ENCOUNTER — DOCUMENTATION ONLY (OUTPATIENT)
Dept: SURGERY | Age: 38
End: 2018-05-03

## 2018-05-03 NOTE — PROGRESS NOTES
PATIENT'S  CALLED AND LEFT MESSAGE, WANTING LETTER OF INSTRUCTIONS FOR SURGERY TO BE MAILED TO HOME ADDRESS. COMPLETED AND MAILED ON 5-3-18.

## 2018-05-08 ENCOUNTER — TELEPHONE (OUTPATIENT)
Dept: SURGERY | Age: 38
End: 2018-05-08

## 2018-05-08 NOTE — TELEPHONE ENCOUNTER
Patient's  L/M on nurse voicemail asking that he be called regarding patient's breast MRI. Is wanting to know if there is anything else showing on the breast MRI other than the known DCIS. I will ask Dr. Charity Motley to call the patient's  to clarify.

## 2018-05-10 ENCOUNTER — TELEPHONE (OUTPATIENT)
Dept: SURGERY | Age: 38
End: 2018-05-10

## 2018-05-10 NOTE — TELEPHONE ENCOUNTER
I spent 25 minutes on the phone with the patient's , answering his questions pertaining to date/time/location of pat, slnb injection, surgery. Answered questions about breast mri and need for mastectomy. Discussed xanax with him. Pt's PCP Dr. Kirt Durham presribed to Ms. Salazar in January when her father . Mr. All Austin said that [facility] has not been taking them. I encouraged him to talk to her about trying it, as Dr. Vaishali Torres usually prescribes xanax during the time of high anxiety for patients. They just cannot wait until surgery is over. I offered them an appt with Dr. Vaishali Torres if they have more questions and want to come in to chat.

## 2018-05-15 ENCOUNTER — ANESTHESIA EVENT (OUTPATIENT)
Dept: MEDSURG UNIT | Age: 38
End: 2018-05-15
Payer: COMMERCIAL

## 2018-05-15 ENCOUNTER — HOSPITAL ENCOUNTER (OUTPATIENT)
Dept: PREADMISSION TESTING | Age: 38
Discharge: HOME | End: 2018-05-15
Payer: COMMERCIAL

## 2018-05-15 VITALS
DIASTOLIC BLOOD PRESSURE: 95 MMHG | HEIGHT: 66 IN | SYSTOLIC BLOOD PRESSURE: 160 MMHG | WEIGHT: 183 LBS | HEART RATE: 82 BPM | TEMPERATURE: 98 F | BODY MASS INDEX: 29.41 KG/M2

## 2018-05-15 PROCEDURE — 85025 COMPLETE CBC W/AUTO DIFF WBC: CPT | Performed by: SURGERY

## 2018-05-15 PROCEDURE — 80048 BASIC METABOLIC PNL TOTAL CA: CPT | Performed by: SURGERY

## 2018-05-15 RX ORDER — IBUPROFEN 200 MG
400 TABLET ORAL
COMMUNITY
End: 2018-05-23

## 2018-05-15 NOTE — PERIOP NOTES
PATIENT GIVEN SURGICAL SITE INFECTION FAQ HANDOUT AND HAND WASHING TIP SHEET. PREOP INSTRUCTIONS REVIEWED AND PATIENT VERBALIZES UNDERSTANDING OF INSTRUCTIONS. PATIENT HAS BEEN GIVEN THE OPPORTUNITY TO ASK QUESTIONS.  CHG WIPES C INSTRUCTIONS WERE GIVEN TO THE PT

## 2018-05-16 LAB
ANION GAP SERPL CALC-SCNC: 13 MMOL/L (ref 5–15)
BASOPHILS # BLD: 0 K/UL (ref 0–0.1)
BASOPHILS NFR BLD: 1 % (ref 0–1)
BUN SERPL-MCNC: 7 MG/DL (ref 6–20)
BUN/CREAT SERPL: 10 (ref 12–20)
CALCIUM SERPL-MCNC: 8.9 MG/DL (ref 8.5–10.1)
CHLORIDE SERPL-SCNC: 107 MMOL/L (ref 97–108)
CO2 SERPL-SCNC: 22 MMOL/L (ref 21–32)
CREAT SERPL-MCNC: 0.67 MG/DL (ref 0.55–1.02)
DIFFERENTIAL METHOD BLD: NORMAL
EOSINOPHIL # BLD: 0.1 K/UL (ref 0–0.4)
EOSINOPHIL NFR BLD: 1 % (ref 0–7)
ERYTHROCYTE [DISTWIDTH] IN BLOOD BY AUTOMATED COUNT: 11.8 % (ref 11.5–14.5)
GLUCOSE SERPL-MCNC: 88 MG/DL (ref 65–100)
HCT VFR BLD AUTO: 41.7 % (ref 35–47)
HGB BLD-MCNC: 14.4 G/DL (ref 11.5–16)
IMM GRANULOCYTES # BLD: 0 K/UL (ref 0–0.04)
IMM GRANULOCYTES NFR BLD AUTO: 0 % (ref 0–0.5)
LYMPHOCYTES # BLD: 1 K/UL (ref 0.8–3.5)
LYMPHOCYTES NFR BLD: 21 % (ref 12–49)
MCH RBC QN AUTO: 33.3 PG (ref 26–34)
MCHC RBC AUTO-ENTMCNC: 34.5 G/DL (ref 30–36.5)
MCV RBC AUTO: 96.5 FL (ref 80–99)
MONOCYTES # BLD: 0.5 K/UL (ref 0–1)
MONOCYTES NFR BLD: 11 % (ref 5–13)
NEUTS SEG # BLD: 3.2 K/UL (ref 1.8–8)
NEUTS SEG NFR BLD: 66 % (ref 32–75)
NRBC # BLD: 0 K/UL (ref 0–0.01)
NRBC BLD-RTO: 0 PER 100 WBC
PLATELET # BLD AUTO: 197 K/UL (ref 150–400)
PMV BLD AUTO: 10.4 FL (ref 8.9–12.9)
POTASSIUM SERPL-SCNC: 3.8 MMOL/L (ref 3.5–5.1)
RBC # BLD AUTO: 4.32 M/UL (ref 3.8–5.2)
SODIUM SERPL-SCNC: 142 MMOL/L (ref 136–145)
WBC # BLD AUTO: 4.9 K/UL (ref 3.6–11)

## 2018-05-21 ENCOUNTER — HOSPITAL ENCOUNTER (OUTPATIENT)
Dept: NUCLEAR MEDICINE | Age: 38
Discharge: HOME | End: 2018-05-21
Attending: SURGERY
Payer: COMMERCIAL

## 2018-05-21 DIAGNOSIS — C50.911 BREAST CANCER, RIGHT (HCC): ICD-10-CM

## 2018-05-21 PROCEDURE — 78195 LYMPH SYSTEM IMAGING: CPT

## 2018-05-22 ENCOUNTER — HOSPITAL ENCOUNTER (OUTPATIENT)
Age: 38
Setting detail: OBSERVATION
Discharge: HOME | End: 2018-05-23
Attending: SURGERY | Admitting: PLASTIC SURGERY
Payer: COMMERCIAL

## 2018-05-22 ENCOUNTER — ANESTHESIA (OUTPATIENT)
Dept: MEDSURG UNIT | Age: 38
End: 2018-05-22
Payer: COMMERCIAL

## 2018-05-22 DIAGNOSIS — D05.11 DUCTAL CARCINOMA IN SITU OF RIGHT BREAST: ICD-10-CM

## 2018-05-22 PROBLEM — C50.919 BREAST CANCER (HCC): Status: ACTIVE | Noted: 2018-05-22

## 2018-05-22 LAB — HCG UR QL: NEGATIVE

## 2018-05-22 PROCEDURE — C1789 PROSTHESIS, BREAST, IMP: HCPCS | Performed by: SURGERY

## 2018-05-22 PROCEDURE — 99218 HC RM OBSERVATION: CPT

## 2018-05-22 PROCEDURE — 77030034850: Performed by: SURGERY

## 2018-05-22 PROCEDURE — 88307 TISSUE EXAM BY PATHOLOGIST: CPT | Performed by: SURGERY

## 2018-05-22 PROCEDURE — 77030034626 HC LIGASURE SM JAW SEAL OPN SURG COVD -E: Performed by: SURGERY

## 2018-05-22 PROCEDURE — 77030015926 HC DEV TISS SEAL J&J -E: Performed by: SURGERY

## 2018-05-22 PROCEDURE — 74011000258 HC RX REV CODE- 258: Performed by: PLASTIC SURGERY

## 2018-05-22 PROCEDURE — 77030018842 HC SOL IRR SOD CL 9% BAXT -A: Performed by: SURGERY

## 2018-05-22 PROCEDURE — 77030013674 HC FIL EXPND TISS ALGN -A: Performed by: SURGERY

## 2018-05-22 PROCEDURE — 88331 PATH CONSLTJ SURG 1 BLK 1SPC: CPT | Performed by: SURGERY

## 2018-05-22 PROCEDURE — 88305 TISSUE EXAM BY PATHOLOGIST: CPT | Performed by: SURGERY

## 2018-05-22 PROCEDURE — 76060000066 HC AMB SURG ANES 3 TO 3.5 HR: Performed by: SURGERY

## 2018-05-22 PROCEDURE — 77030002933 HC SUT MCRYL J&J -A: Performed by: SURGERY

## 2018-05-22 PROCEDURE — 74011250636 HC RX REV CODE- 250/636: Performed by: PLASTIC SURGERY

## 2018-05-22 PROCEDURE — 77030011266 HC ELECTRD BLD INSL COVD -A: Performed by: SURGERY

## 2018-05-22 PROCEDURE — 74011000250 HC RX REV CODE- 250

## 2018-05-22 PROCEDURE — 77030012407 HC DRN WND BARD -B: Performed by: SURGERY

## 2018-05-22 PROCEDURE — 77030002996 HC SUT SLK J&J -A: Performed by: SURGERY

## 2018-05-22 PROCEDURE — 77030019908 HC STETH ESOPH SIMS -A: Performed by: ANESTHESIOLOGY

## 2018-05-22 PROCEDURE — 77030011640 HC PAD GRND REM COVD -A: Performed by: SURGERY

## 2018-05-22 PROCEDURE — 77030032490 HC SLV COMPR SCD KNE COVD -B: Performed by: SURGERY

## 2018-05-22 PROCEDURE — 74011000250 HC RX REV CODE- 250: Performed by: PLASTIC SURGERY

## 2018-05-22 PROCEDURE — 77030019702 HC WRP THER MENM -C: Performed by: SURGERY

## 2018-05-22 PROCEDURE — 77030018836 HC SOL IRR NACL ICUM -A: Performed by: SURGERY

## 2018-05-22 PROCEDURE — 88342 IMHCHEM/IMCYTCHM 1ST ANTB: CPT | Performed by: SURGERY

## 2018-05-22 PROCEDURE — 77030028700 HC BLD TISS PLSM MEDT -E: Performed by: SURGERY

## 2018-05-22 PROCEDURE — 77030013567 HC DRN WND RESERV BARD -A: Performed by: SURGERY

## 2018-05-22 PROCEDURE — 77030011267 HC ELECTRD BLD COVD -A: Performed by: SURGERY

## 2018-05-22 PROCEDURE — 74011250637 HC RX REV CODE- 250/637: Performed by: ANESTHESIOLOGY

## 2018-05-22 PROCEDURE — 77030002966 HC SUT PDS J&J -A: Performed by: SURGERY

## 2018-05-22 PROCEDURE — 77030002986 HC SUT PROL J&J -A: Performed by: SURGERY

## 2018-05-22 PROCEDURE — C9290 INJ, BUPIVACAINE LIPOSOME: HCPCS | Performed by: PLASTIC SURGERY

## 2018-05-22 PROCEDURE — 74011250636 HC RX REV CODE- 250/636

## 2018-05-22 PROCEDURE — 76030000006 HC AMB SURG OR TIME 3 TO 3.5: Performed by: SURGERY

## 2018-05-22 PROCEDURE — 77030020782 HC GWN BAIR PAWS FLX 3M -B

## 2018-05-22 PROCEDURE — 77030008467 HC STPLR SKN COVD -B: Performed by: SURGERY

## 2018-05-22 PROCEDURE — 76210000038 HC AMBSU PH I REC 2.5 TO 3 HR: Performed by: SURGERY

## 2018-05-22 PROCEDURE — 88341 IMHCHEM/IMCYTCHM EA ADD ANTB: CPT | Performed by: SURGERY

## 2018-05-22 PROCEDURE — 81025 URINE PREGNANCY TEST: CPT

## 2018-05-22 PROCEDURE — 77030008684 HC TU ET CUF COVD -B: Performed by: ANESTHESIOLOGY

## 2018-05-22 PROCEDURE — 74011250636 HC RX REV CODE- 250/636: Performed by: ANESTHESIOLOGY

## 2018-05-22 PROCEDURE — 77030011825 HC SUPP SURG PSTOP S2SG -B: Performed by: SURGERY

## 2018-05-22 PROCEDURE — 74011250637 HC RX REV CODE- 250/637: Performed by: PLASTIC SURGERY

## 2018-05-22 PROCEDURE — 88360 TUMOR IMMUNOHISTOCHEM/MANUAL: CPT | Performed by: SURGERY

## 2018-05-22 PROCEDURE — 74011000250 HC RX REV CODE- 250: Performed by: ANESTHESIOLOGY

## 2018-05-22 PROCEDURE — 74011000272 HC RX REV CODE- 272: Performed by: PLASTIC SURGERY

## 2018-05-22 DEVICE — Z DISCONTINUED NO SUB IDED GRAFT HUM TISS W8XL16CM THK1.04-2.28MM THCK REGEN TISS MTRX: Type: IMPLANTABLE DEVICE | Site: BREAST | Status: FUNCTIONAL

## 2018-05-22 RX ORDER — SODIUM CHLORIDE 0.9 % (FLUSH) 0.9 %
5-10 SYRINGE (ML) INJECTION AS NEEDED
Status: DISCONTINUED | OUTPATIENT
Start: 2018-05-22 | End: 2018-05-22 | Stop reason: HOSPADM

## 2018-05-22 RX ORDER — MIDAZOLAM HYDROCHLORIDE 1 MG/ML
0.5 INJECTION, SOLUTION INTRAMUSCULAR; INTRAVENOUS
Status: DISCONTINUED | OUTPATIENT
Start: 2018-05-22 | End: 2018-05-22 | Stop reason: HOSPADM

## 2018-05-22 RX ORDER — LIDOCAINE HYDROCHLORIDE 20 MG/ML
INJECTION, SOLUTION EPIDURAL; INFILTRATION; INTRACAUDAL; PERINEURAL AS NEEDED
Status: DISCONTINUED | OUTPATIENT
Start: 2018-05-22 | End: 2018-05-22 | Stop reason: HOSPADM

## 2018-05-22 RX ORDER — LIDOCAINE HYDROCHLORIDE 10 MG/ML
0.1 INJECTION, SOLUTION EPIDURAL; INFILTRATION; INTRACAUDAL; PERINEURAL AS NEEDED
Status: DISCONTINUED | OUTPATIENT
Start: 2018-05-22 | End: 2018-05-22 | Stop reason: HOSPADM

## 2018-05-22 RX ORDER — FENTANYL CITRATE 50 UG/ML
25 INJECTION, SOLUTION INTRAMUSCULAR; INTRAVENOUS
Status: DISCONTINUED | OUTPATIENT
Start: 2018-05-22 | End: 2018-05-22 | Stop reason: HOSPADM

## 2018-05-22 RX ORDER — SODIUM CHLORIDE, SODIUM LACTATE, POTASSIUM CHLORIDE, CALCIUM CHLORIDE 600; 310; 30; 20 MG/100ML; MG/100ML; MG/100ML; MG/100ML
INJECTION, SOLUTION INTRAVENOUS
Status: DISCONTINUED | OUTPATIENT
Start: 2018-05-22 | End: 2018-05-22 | Stop reason: HOSPADM

## 2018-05-22 RX ORDER — DIAZEPAM 5 MG/1
5 TABLET ORAL
Status: DISCONTINUED | OUTPATIENT
Start: 2018-05-22 | End: 2018-05-23 | Stop reason: HOSPADM

## 2018-05-22 RX ORDER — SODIUM CHLORIDE 0.9 % (FLUSH) 0.9 %
5-10 SYRINGE (ML) INJECTION EVERY 8 HOURS
Status: DISCONTINUED | OUTPATIENT
Start: 2018-05-22 | End: 2018-05-22 | Stop reason: HOSPADM

## 2018-05-22 RX ORDER — HYDROMORPHONE HYDROCHLORIDE 2 MG/ML
0.2 INJECTION, SOLUTION INTRAMUSCULAR; INTRAVENOUS; SUBCUTANEOUS
Status: DISCONTINUED | OUTPATIENT
Start: 2018-05-22 | End: 2018-05-23 | Stop reason: HOSPADM

## 2018-05-22 RX ORDER — CEFAZOLIN SODIUM IN 0.9 % NACL 2 G/100 ML
PLASTIC BAG, INJECTION (ML) INTRAVENOUS AS NEEDED
Status: DISCONTINUED | OUTPATIENT
Start: 2018-05-22 | End: 2018-05-22 | Stop reason: HOSPADM

## 2018-05-22 RX ORDER — SCOLOPAMINE TRANSDERMAL SYSTEM 1 MG/1
1 PATCH, EXTENDED RELEASE TRANSDERMAL ONCE
Status: DISCONTINUED | OUTPATIENT
Start: 2018-05-22 | End: 2018-05-23 | Stop reason: HOSPADM

## 2018-05-22 RX ORDER — DIPHENHYDRAMINE HYDROCHLORIDE 50 MG/ML
12.5 INJECTION, SOLUTION INTRAMUSCULAR; INTRAVENOUS AS NEEDED
Status: DISCONTINUED | OUTPATIENT
Start: 2018-05-22 | End: 2018-05-22 | Stop reason: HOSPADM

## 2018-05-22 RX ORDER — ACETAMINOPHEN 10 MG/ML
INJECTION, SOLUTION INTRAVENOUS AS NEEDED
Status: DISCONTINUED | OUTPATIENT
Start: 2018-05-22 | End: 2018-05-22 | Stop reason: HOSPADM

## 2018-05-22 RX ORDER — ONDANSETRON 2 MG/ML
4 INJECTION INTRAMUSCULAR; INTRAVENOUS AS NEEDED
Status: DISCONTINUED | OUTPATIENT
Start: 2018-05-22 | End: 2018-05-22 | Stop reason: HOSPADM

## 2018-05-22 RX ORDER — FENTANYL CITRATE 50 UG/ML
INJECTION, SOLUTION INTRAMUSCULAR; INTRAVENOUS AS NEEDED
Status: DISCONTINUED | OUTPATIENT
Start: 2018-05-22 | End: 2018-05-22 | Stop reason: HOSPADM

## 2018-05-22 RX ORDER — BACITRACIN ZINC 500 UNIT/G
OINTMENT (GRAM) TOPICAL AS NEEDED
Status: DISCONTINUED | OUTPATIENT
Start: 2018-05-22 | End: 2018-05-22 | Stop reason: HOSPADM

## 2018-05-22 RX ORDER — LABETALOL HYDROCHLORIDE 5 MG/ML
10 INJECTION, SOLUTION INTRAVENOUS ONCE
Status: COMPLETED | OUTPATIENT
Start: 2018-05-22 | End: 2018-05-22

## 2018-05-22 RX ORDER — DEXAMETHASONE SODIUM PHOSPHATE 4 MG/ML
INJECTION, SOLUTION INTRA-ARTICULAR; INTRALESIONAL; INTRAMUSCULAR; INTRAVENOUS; SOFT TISSUE AS NEEDED
Status: DISCONTINUED | OUTPATIENT
Start: 2018-05-22 | End: 2018-05-22 | Stop reason: HOSPADM

## 2018-05-22 RX ORDER — MIDAZOLAM HYDROCHLORIDE 1 MG/ML
INJECTION, SOLUTION INTRAMUSCULAR; INTRAVENOUS AS NEEDED
Status: DISCONTINUED | OUTPATIENT
Start: 2018-05-22 | End: 2018-05-22 | Stop reason: HOSPADM

## 2018-05-22 RX ORDER — SODIUM CHLORIDE 9 MG/ML
25 INJECTION, SOLUTION INTRAVENOUS CONTINUOUS
Status: DISCONTINUED | OUTPATIENT
Start: 2018-05-22 | End: 2018-05-22 | Stop reason: HOSPADM

## 2018-05-22 RX ORDER — HYDROMORPHONE HYDROCHLORIDE 1 MG/ML
0.2 INJECTION, SOLUTION INTRAMUSCULAR; INTRAVENOUS; SUBCUTANEOUS
Status: DISCONTINUED | OUTPATIENT
Start: 2018-05-22 | End: 2018-05-22 | Stop reason: HOSPADM

## 2018-05-22 RX ORDER — HYDROMORPHONE HYDROCHLORIDE 2 MG/ML
INJECTION, SOLUTION INTRAMUSCULAR; INTRAVENOUS; SUBCUTANEOUS AS NEEDED
Status: DISCONTINUED | OUTPATIENT
Start: 2018-05-22 | End: 2018-05-22 | Stop reason: HOSPADM

## 2018-05-22 RX ORDER — MIDAZOLAM HYDROCHLORIDE 1 MG/ML
1 INJECTION, SOLUTION INTRAMUSCULAR; INTRAVENOUS AS NEEDED
Status: DISCONTINUED | OUTPATIENT
Start: 2018-05-22 | End: 2018-05-22 | Stop reason: HOSPADM

## 2018-05-22 RX ORDER — ROCURONIUM BROMIDE 10 MG/ML
INJECTION, SOLUTION INTRAVENOUS AS NEEDED
Status: DISCONTINUED | OUTPATIENT
Start: 2018-05-22 | End: 2018-05-22 | Stop reason: HOSPADM

## 2018-05-22 RX ORDER — OXYCODONE AND ACETAMINOPHEN 5; 325 MG/1; MG/1
1 TABLET ORAL AS NEEDED
Status: DISCONTINUED | OUTPATIENT
Start: 2018-05-22 | End: 2018-05-22

## 2018-05-22 RX ORDER — ONDANSETRON 2 MG/ML
INJECTION INTRAMUSCULAR; INTRAVENOUS AS NEEDED
Status: DISCONTINUED | OUTPATIENT
Start: 2018-05-22 | End: 2018-05-22 | Stop reason: HOSPADM

## 2018-05-22 RX ORDER — PROPOFOL 10 MG/ML
INJECTION, EMULSION INTRAVENOUS AS NEEDED
Status: DISCONTINUED | OUTPATIENT
Start: 2018-05-22 | End: 2018-05-22 | Stop reason: HOSPADM

## 2018-05-22 RX ORDER — MORPHINE SULFATE 10 MG/ML
2 INJECTION, SOLUTION INTRAMUSCULAR; INTRAVENOUS
Status: DISCONTINUED | OUTPATIENT
Start: 2018-05-22 | End: 2018-05-22 | Stop reason: HOSPADM

## 2018-05-22 RX ORDER — LABETALOL HYDROCHLORIDE 5 MG/ML
INJECTION, SOLUTION INTRAVENOUS AS NEEDED
Status: DISCONTINUED | OUTPATIENT
Start: 2018-05-22 | End: 2018-05-22 | Stop reason: HOSPADM

## 2018-05-22 RX ORDER — SODIUM CHLORIDE, SODIUM LACTATE, POTASSIUM CHLORIDE, CALCIUM CHLORIDE 600; 310; 30; 20 MG/100ML; MG/100ML; MG/100ML; MG/100ML
125 INJECTION, SOLUTION INTRAVENOUS CONTINUOUS
Status: DISCONTINUED | OUTPATIENT
Start: 2018-05-22 | End: 2018-05-22 | Stop reason: HOSPADM

## 2018-05-22 RX ORDER — ONDANSETRON 2 MG/ML
4 INJECTION INTRAMUSCULAR; INTRAVENOUS
Status: DISCONTINUED | OUTPATIENT
Start: 2018-05-22 | End: 2018-05-23 | Stop reason: HOSPADM

## 2018-05-22 RX ORDER — LABETALOL HYDROCHLORIDE 5 MG/ML
10 INJECTION, SOLUTION INTRAVENOUS ONCE
Status: DISCONTINUED | OUTPATIENT
Start: 2018-05-22 | End: 2018-05-22 | Stop reason: HOSPADM

## 2018-05-22 RX ORDER — HYDROMORPHONE HYDROCHLORIDE 2 MG/1
2-4 TABLET ORAL
Status: DISCONTINUED | OUTPATIENT
Start: 2018-05-22 | End: 2018-05-23 | Stop reason: HOSPADM

## 2018-05-22 RX ORDER — DEXTROSE, SODIUM CHLORIDE, AND POTASSIUM CHLORIDE 5; .45; .15 G/100ML; G/100ML; G/100ML
50 INJECTION INTRAVENOUS CONTINUOUS
Status: DISCONTINUED | OUTPATIENT
Start: 2018-05-22 | End: 2018-05-23 | Stop reason: HOSPADM

## 2018-05-22 RX ORDER — FENTANYL CITRATE 50 UG/ML
50 INJECTION, SOLUTION INTRAMUSCULAR; INTRAVENOUS AS NEEDED
Status: DISCONTINUED | OUTPATIENT
Start: 2018-05-22 | End: 2018-05-22 | Stop reason: HOSPADM

## 2018-05-22 RX ADMIN — LABETALOL HYDROCHLORIDE 10 MG: 5 INJECTION, SOLUTION INTRAVENOUS at 11:50

## 2018-05-22 RX ADMIN — PROPOFOL 200 MG: 10 INJECTION, EMULSION INTRAVENOUS at 07:38

## 2018-05-22 RX ADMIN — HYDROMORPHONE HYDROCHLORIDE 1 MG: 2 INJECTION, SOLUTION INTRAMUSCULAR; INTRAVENOUS; SUBCUTANEOUS at 08:00

## 2018-05-22 RX ADMIN — MIDAZOLAM HYDROCHLORIDE 7 MG: 1 INJECTION, SOLUTION INTRAMUSCULAR; INTRAVENOUS at 07:30

## 2018-05-22 RX ADMIN — FENTANYL CITRATE 150 MCG: 50 INJECTION, SOLUTION INTRAMUSCULAR; INTRAVENOUS at 07:38

## 2018-05-22 RX ADMIN — ROCURONIUM BROMIDE 50 MG: 10 INJECTION, SOLUTION INTRAVENOUS at 07:39

## 2018-05-22 RX ADMIN — LABETALOL HYDROCHLORIDE 5 MG: 5 INJECTION, SOLUTION INTRAVENOUS at 08:22

## 2018-05-22 RX ADMIN — Medication 2 G: at 07:45

## 2018-05-22 RX ADMIN — FENTANYL CITRATE 25 MCG: 50 INJECTION, SOLUTION INTRAMUSCULAR; INTRAVENOUS at 11:08

## 2018-05-22 RX ADMIN — DEXTROSE MONOHYDRATE, SODIUM CHLORIDE, AND POTASSIUM CHLORIDE 50 ML/HR: 50; 4.5; 1.49 INJECTION, SOLUTION INTRAVENOUS at 14:20

## 2018-05-22 RX ADMIN — PROPOFOL 100 MG: 10 INJECTION, EMULSION INTRAVENOUS at 07:39

## 2018-05-22 RX ADMIN — HYDROMORPHONE HYDROCHLORIDE 4 MG: 2 TABLET ORAL at 14:21

## 2018-05-22 RX ADMIN — CEFAZOLIN SODIUM 1 G: 1 INJECTION, POWDER, FOR SOLUTION INTRAMUSCULAR; INTRAVENOUS at 16:05

## 2018-05-22 RX ADMIN — ROCURONIUM BROMIDE 20 MG: 10 INJECTION, SOLUTION INTRAVENOUS at 08:18

## 2018-05-22 RX ADMIN — FENTANYL CITRATE 100 MCG: 50 INJECTION, SOLUTION INTRAMUSCULAR; INTRAVENOUS at 07:30

## 2018-05-22 RX ADMIN — ACETAMINOPHEN 1000 MG: 10 INJECTION, SOLUTION INTRAVENOUS at 08:01

## 2018-05-22 RX ADMIN — SODIUM CHLORIDE, SODIUM LACTATE, POTASSIUM CHLORIDE, CALCIUM CHLORIDE: 600; 310; 30; 20 INJECTION, SOLUTION INTRAVENOUS at 08:55

## 2018-05-22 RX ADMIN — SODIUM CHLORIDE, SODIUM LACTATE, POTASSIUM CHLORIDE, CALCIUM CHLORIDE: 600; 310; 30; 20 INJECTION, SOLUTION INTRAVENOUS at 07:30

## 2018-05-22 RX ADMIN — HYDROMORPHONE HYDROCHLORIDE 1 MG: 2 INJECTION, SOLUTION INTRAMUSCULAR; INTRAVENOUS; SUBCUTANEOUS at 08:18

## 2018-05-22 RX ADMIN — DEXAMETHASONE SODIUM PHOSPHATE 10 MG: 4 INJECTION, SOLUTION INTRA-ARTICULAR; INTRALESIONAL; INTRAMUSCULAR; INTRAVENOUS; SOFT TISSUE at 07:31

## 2018-05-22 RX ADMIN — ROCURONIUM BROMIDE 10 MG: 10 INJECTION, SOLUTION INTRAVENOUS at 09:39

## 2018-05-22 RX ADMIN — FENTANYL CITRATE 25 MCG: 50 INJECTION, SOLUTION INTRAMUSCULAR; INTRAVENOUS at 11:01

## 2018-05-22 RX ADMIN — SODIUM CHLORIDE, SODIUM LACTATE, POTASSIUM CHLORIDE, AND CALCIUM CHLORIDE 125 ML/HR: 600; 310; 30; 20 INJECTION, SOLUTION INTRAVENOUS at 07:22

## 2018-05-22 RX ADMIN — HYDROMORPHONE HYDROCHLORIDE 4 MG: 2 TABLET ORAL at 20:36

## 2018-05-22 RX ADMIN — LIDOCAINE HYDROCHLORIDE 80 MG: 20 INJECTION, SOLUTION EPIDURAL; INFILTRATION; INTRACAUDAL; PERINEURAL at 07:38

## 2018-05-22 RX ADMIN — ONDANSETRON 4 MG: 2 INJECTION INTRAMUSCULAR; INTRAVENOUS at 07:30

## 2018-05-22 NOTE — PERIOP NOTES
Diastolic blood pressure 91'V to low 100's. Patient denies history of hypertension. Pre op blood pressure elevated. Call to Dr Rick Monroy. Labetalol 10 mg ordered and given.

## 2018-05-22 NOTE — OP NOTES
[address] [place] [address] REPORT    Yuki Rodrigez  MR#: [id number]  : 1980  ACCOUNT #: [de-identified]   DATE OF SERVICE: 2018    PREOPERATIVE DIAGNOSES:    1. Right breast cancer. 2.  Status post right skin-sparing mastectomy. POSTOPERATIVE DIAGNOSES:    1. Right breast cancer. 2.  Status post right skin-sparing mastectomy. PROCEDURE PERFORMED:  Right-sided breast tissue expander placement with AlloDerm. ANESTHESIA:  General.    COMPLICATIONS:  None. DRAINS:  #15 round Ethan-Clifton drain. SURGEON:  Solomon Walls MD    ASSISTANT:  Bouchra Hester. ESTIMATED BLOOD LOSS:  None. IMPLANTS:   See below    SPECIMENS REMOVED:  None. COMPLICATIONS:  None. INDICATIONS FOR SURGERY:  The patient is a 26-year-old woman who has right-sided breast cancer. She is here for Dr. Rashaad Valdez to perform a right skin sparing mastectomy with sentinel node biopsy. She has opted for tissue expansion with AlloDerm. She agreed to the risks and benefits and signed informed consent. She was marked in the preoperative holding area in the upright position. She was then taken back to the operating room and Dr. Rashaad Valdez performed her portion of the procedure. For details of her procedure, please see her operative note from this date. After she completed her procedure, the patient was reprepped and redraped in a sterile surgical fashion using Betadine paint. A timeout was performed in order to verify the patient's identity and surgical sites, which were both correct. She had already been given IV Ancef for preoperative antibiotics and this did not need to be redosed during my procedure. The pectoralis major muscle was elevated off the inframammary fold at the chest wall using electrocautery. After this was done, the AlloDerm was prepared on the back table by soaking it in sterile saline.   The AlloDerm information is AlloDerm Select tissue matrix, ready to use, 8 x 16 cm thick, lot #[id number], expiration 2020-01, reference number is [id number]. At this point, it was tailored and inset at the inframammary fold using interrupted horizontal mattress 3-0 PDS suture. This was reinforced using a running 3-0 PDS suture. Exparel, which was diluted with 30 mL of 0.25% Marcaine plain, was injected into the breast and breast pocket in order for postoperative pain management and relief. Approximately 40 mL of this mixture was utilized. A #15 round Ethan-Clifton drain was placed at the base of the operative breast exiting the lateral chest wall. This was secured using an interrupted 3-0 PDS suture. The operative site was irrigated using triple antibiotic solution which consisted of bacitracin, Ancef, and gentamicin irrigation. Excellent hemostasis was achieved using electrocautery. A tissue expander was prepared on the back table and inset into the subpectoral and sub-AlloDerm space. The tissue expander information is Allergan1 style 133MX tissue expander holding 400 mL, reference [id number], # [id number]. The pectoralis and AlloDerm interface was closed using an interrupted horizontal mattress 3-0 PDS suture and the running 3-0 PDS suture was used to reinforce this incision site. At this point, the deep dermis was closed using interrupted 3-0 Monocryl and the skin was closed using a running subcuticular 4-0 Monocryl. The tissue expander was inflated using sterile saline. This was done using a magna finder to find the port and a butterfly needle was utilized in order to place the saline within the expander. There was no tension on the skin at the conclusion of the expansion, approximately 200 mL was inflated into the tissue expander. There were no areas of skin or vascular compromise at the conclusion of the procedure. Of note, the breast tissue weighed 955 grams. No specimens were sent to pathology for my portion of the procedure.   The dressing consisted of bacitracin ointment, Xeroform, 4 x 4's, ABD and a surgical bra. The patient was extubated and transferred to the PACU in stable condition. The patient tolerated the procedure without complication. The instrument, sponge and needle count was correct at the conclusion of the case.       Lauren Larose MD SA / MARU  D: 05/22/2018 10:58     T: 05/22/2018 11:39  JOB #: [id number]

## 2018-05-22 NOTE — H&P
HISTORY OF PRESENT ILLNESS  Mane Russo is a 44 y.o. female. HPI  NEW patient consult referred by Dr. Tere Joseph for 2 site RIGHT breast DCIS. Had been feeling pain to her RIGHT breast. No pain today. Does not palpate a breast lump. Cancer found on screening mammogram.  Denies skin changes or nipple discharge/retraction.      Denies FH of breast or ovarian cancer.      Mammogram/ultrasound, VWC, BIRADS 4C multiple irregular hypoechoic masses from 9:00 to 10:00   Largest 2.9 cm  10:00 4 cfn and 10 cfn DCIS high grade with comedonecrosis. Er/Pr negative       Past Medical History:   Diagnosis Date    Calculus of kidney     NVD (normal vaginal delivery) 2017    Overweight(278.02) 2013      No past surgical history on file. Social History            Social History    Marital status:        Spouse name: N/A    Number of children: N/A    Years of education: N/A          Occupational History    Not on file.             Social History Main Topics    Smoking status: Never Smoker    Smokeless tobacco: Never Used    Alcohol use 1.0 oz/week        2 Glasses of wine per week     Drug use: No    Sexual activity: Yes       Partners: Male       Birth control/ protection: None, Condom           Other Topics Concern    Not on file      Social History Narrative      OB History      Para Term  AB Living     2 2       2    SAB TAB Ectopic Molar Multiple Live Births                         Obstetric Comments     Menarche:  11. LMP:3/8/18 # of Children:  2. Age at Delivery of First Child: 31 .   Hysterectomy/oophorectomy:  NO/NO. Breast Bx:  yes. Hx of Breast Feeding:  yes. BCP:  NO. Hormone therapy:  no.             Current Outpatient Prescriptions:     ALPRAZolam (XANAX) 0.25 mg tablet, Take 1 Tab by mouth nightly as needed for Anxiety.  Max Daily Amount: 0.25 mg., Disp: 7 Tab, Rfl: 0       Allergies   Allergen Reactions    Levaquin [Levofloxacin] Anaphylaxis    Oxycodone Rash            Review of Systems   Constitutional: Negative. HENT: Negative. Eyes: Negative. Respiratory: Negative. Cardiovascular: Negative. Gastrointestinal: Negative. Genitourinary: Negative. Musculoskeletal: Negative. Skin: Negative. Neurological: Negative. Endo/Heme/Allergies: Negative. Psychiatric/Behavioral: Negative.          Physical Exam   Pulmonary/Chest: Right breast exhibits mass. Right breast exhibits no inverted nipple, no nipple discharge, no skin change and no tenderness. Left breast exhibits no inverted nipple, no mass, no nipple discharge, no skin change and no tenderness. Breasts are symmetrical.       Lymphadenopathy:     She has no cervical adenopathy. She has no axillary adenopathy. Nursing note and vitals reviewed.        ASSESSMENT and PLAN      ICD-10-CM ICD-9-CM     1. Ductal carcinoma in situ (DCIS) of right breast D05.11 233.0 MRI BREAST BI W WO CONT      45 yo female with multifocal dcis er/pr negative, high grade with comedonecrosis. She is here with her . I have reviewed the imaging and pathology with her and she was given copies of these reports.      Plan is skin sparing mastectomy, sln biopsy. Recon by dr. Loulou Phillips. The procedure and risks were discussed. Risks include bleeding, bruising, scar, infection, need for more surgery and lymphedema.

## 2018-05-22 NOTE — ANESTHESIA POSTPROCEDURE EVALUATION
Post-Anesthesia Evaluation and Assessment    Patient: Papo Liao MRN: [id number]  SSN: [id number]    YOB: 1980  Age: 44 y.o. Sex: female       Cardiovascular Function/Vital Signs  Visit Vitals    BP (!) 157/104    Pulse 83    Temp 36.8 °C (98.3 °F)    Resp 9    Ht 5' 6\" (1.676 m)    Wt 83.3 kg (183 lb 10.3 oz)    SpO2 97%    BMI 29.64 kg/m2       Patient is status post general anesthesia for Procedure(s):  RIGHT BREAST MASTECTOMY, RIGHT BREAST SENTINEL NODE BIOPSY AND RIGHT BREAST RECONSTRUCTION WITH TISSUE EXPANDER AND ALLODERM  SENTINEL NODE BIOPSY  RIGHT BREAST RECONSTRUCTION WITH ALLODERM AND TISSUE EXPANDER. Nausea/Vomiting: None    Postoperative hydration reviewed and adequate. Pain:  Pain Scale 1: Numeric (0 - 10) (05/22/18 1115)  Pain Intensity 1: 4 (05/22/18 1115)   Managed    Neurological Status:   Neuro (WDL): Within Defined Limits (05/22/18 0711)   At baseline    Mental Status and Level of Consciousness: Arousable    Pulmonary Status:   O2 Device: Nasal cannula (05/22/18 1049)   Adequate oxygenation and airway patent    Complications related to anesthesia: None    Post-anesthesia assessment completed.  No concerns    Signed By: Marla Up DO     May 22, 2018

## 2018-05-22 NOTE — IP AVS SNAPSHOT
[address] 
[phone] Patient: Angelica Rothman MRN: [id number] IMQ:5/79/5657 A check elsy indicates which time of day the medication should be taken. My Medications START taking these medications Instructions Each Dose to Equal  
 Morning Noon Evening Bedtime  
 cephALEXin 250 mg capsule Commonly known as:  Junius Alpers Your last dose was: Your next dose is: Take 2 Caps by mouth three (3) times daily for 7 days. 500 mg  
    
   
   
   
  
 diazePAM 5 mg tablet Commonly known as:  VALIUM Your last dose was: Your next dose is: Take 1 Tab by mouth every six (6) hours as needed (anxiety or muscle spasm). Max Daily Amount: 20 mg. Indications: Muscle Spasm 5 mg HYDROmorphone 2 mg tablet Commonly known as:  DILAUDID Your last dose was: Your next dose is: Take 1-2 Tabs by mouth every four (4) hours as needed. Max Daily Amount: 24 mg.  
 2-4 mg  
    
   
   
   
  
 naloxone 4 mg/actuation nasal spray Commonly known as:  [employer] Your last dose was: Your next dose is:    
   
   
 Use 1 spray intranasally into 1 nostril. Use a new Narcan nasal spray for subsequent doses and administer into alternating nostrils. May repeat every 2 to 3 minutes as needed. Indications: OPIATE-INDUCED RESPIRATORY DEPRESSION, Opioid Toxicity STOP taking these medications MOTRIN  mg tablet Generic drug:  ibuprofen Where to Get Your Medications Information on where to get these meds will be given to you by the nurse or doctor. ! Ask your nurse or doctor about these medications  
  cephALEXin 250 mg capsule  
 diazePAM 5 mg tablet HYDROmorphone 2 mg tablet  
 naloxone 4 mg/actuation nasal spray

## 2018-05-22 NOTE — BRIEF OP NOTE
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, S/P MASTECTOMY RIGHT  Postoperative Diagnosis: RIGHT BREAST DUCTAL CARCINOMA IN SITU    Procedure(s):  RIGHT BREAST MASTECTOMY, RIGHT BREAST SENTINEL NODE BIOPSY AND RIGHT BREAST RECONSTRUCTION WITH TISSUE EXPANDER AND ALLODERM  SENTINEL NODE BIOPSY  RIGHT BREAST RECONSTRUCTION WITH ALLODERM AND TISSUE EXPANDER  Surgeon(s) and Role:  Panel 1:     * Dayron Salvador MD - Primary    Panel 2:     * Dolores Trinidad MD - Primary         Surgical Assistant: Crissy Fine    Surgical Staff:  Circ-1: Adolfo Libman, RN  Circ-Relief: Chago Pina RN  Registered Nurse Assistant: Abdirizak Ann RN  Scrub RN-1: Radhames Medina RN  Event Time In   Incision Start 0809   Incision Close 1035     Anesthesia: General   Estimated Blood Loss: minimal  Specimens:   ID Type Source Tests Collected by Time Destination   1 : RIGHT AXILLARY SENTINEL NODE #1 Wilfred Hudson MD 5/22/2018 0830 Pathology   2 : RIGHT AXILLARY SENTINEL NODE #2 Frozen Section Emily Sánchze MD 5/22/2018 8324 Pathology   3 : right breast tail of breast node Frozen Section Judiellcharli Salvador MD 5/22/2018 7609 Pathology   4 : right breast mastectomy Fresh Breast  [place] Yeimy Bowen MD 5/22/2018 4560 Pathology      Findings: none  Complications: none  Implants:   Implant Name Type Inv.  Item Serial No.  Lot No. LRB No. Used Action   GRAFT TISS ALLODERM [id number] -- THICK 128 SQ CM - SN/A  GRAFT TISS ALLODERM 8X16CM -- THICK 128 SQ CM N/A [employer] [id number] Right 1 Implanted   NATRELLE ALLERGAN 400CC TISSUE EXPANDER Other   [id number]   N/A Right 1 Implanted

## 2018-05-22 NOTE — ROUTINE PROCESS
Patient: Yoel Julian MRN: [id number]  SSN: [id number]   YOB: 1980  Age: 44 y.o. Sex: female     Patient is status post Procedure(s) with comments:  RIGHT BREAST MASTECTOMY, RIGHT BREAST SENTINEL NODE BIOPSY AND RIGHT BREAST RECONSTRUCTION WITH TISSUE EXPANDER AND ALLODERM - Dr Roger Tejeda' s procedure ended at [facility].     Surgeon(s) and Role:  Panel 1:     * Will Juarez MD - Primary    Panel 2:     * Shannen Jama MD - Primary    Local/Dose/Irrigation:                    Peripheral IV 05/22/18 Left Arm (Active)          Drain 15 F DRAIN 05/22/18 Right Other (comment) (Active)                     Dressing/Packing:  Wound Breast Right-DRESSING TYPE:  (XEROFORM, 4X4, ABD, SURGIBRA) (05/22/18 0900)  Splint/Cast:  ]    Other:

## 2018-05-22 NOTE — PERIOP NOTES
TRANSFER - OUT REPORT:    Verbal report given to CONCHITA León RN(name) on Marialuisa Salazar  being transferred to [facility] [address](unit) for routine post - op       Report consisted of patients Situation, Background, Assessment and   Recommendations(SBAR). Information from the following report(s) Procedure Summary, Intake/Output and MAR was reviewed with the receiving nurse. Lines:   Peripheral IV 05/22/18 Left Arm (Active)   Site Assessment Clean, dry, & intact 5/22/2018 10:44 AM   Phlebitis Assessment 0 5/22/2018 10:44 AM   Infiltration Assessment 0 5/22/2018 10:44 AM   Dressing Status Clean, dry, & intact 5/22/2018 10:44 AM   Dressing Type Transparent 5/22/2018 10:44 AM   Hub Color/Line Status Blue; Infusing 5/22/2018 10:44 AM        Opportunity for questions and clarification was provided.       Patient transported with:   Registered Nurse

## 2018-05-22 NOTE — PROGRESS NOTES
TRANSFER - IN REPORT:    Verbal report received from Carol Ann RN(name) on [facility]  being received from Ambulatory Surgery(unit) for routine post - op      Report consisted of patients Situation, Background, Assessment and   Recommendations(SBAR). Information from the following report(s) SBAR, Kardex, Procedure Summary, Intake/Output, MAR, Accordion, Recent Results and Med Rec Status was reviewed with the receiving nurse. Opportunity for questions and clarification was provided. Asked about power D/C time & date, along with breakthrough pain medication.

## 2018-05-22 NOTE — ANESTHESIA PREPROCEDURE EVALUATION
Anesthetic History   No history of anesthetic complications            Review of Systems / Medical History  Patient summary reviewed, nursing notes reviewed and pertinent labs reviewed    Pulmonary  Within defined limits                 Neuro/Psych   Within defined limits           Cardiovascular  Within defined limits                     GI/Hepatic/Renal  Within defined limits              Endo/Other        Cancer     Other Findings              Physical Exam    Airway  Mallampati: II  TM Distance: > 6 cm  Neck ROM: normal range of motion   Mouth opening: Normal     Cardiovascular  Regular rate and rhythm,  S1 and S2 normal,  no murmur, click, rub, or gallop             Dental  No notable dental hx       Pulmonary  Breath sounds clear to auscultation               Abdominal  GI exam deferred       Other Findings            Anesthetic Plan    ASA: 2  Anesthesia type: general          Induction: Intravenous  Anesthetic plan and risks discussed with: Patient

## 2018-05-22 NOTE — IP AVS SNAPSHOT
[address] [facility] [address] [place] [address] 
[phone] Patient: Mane Russo MRN: [id number] VK About your hospitalization You were admitted on:  May 22, 2018 You last received care in the:  [address] [place] [address] You were discharged on:  May 23, 2018 Why you were hospitalized Your primary diagnosis was:  Not on File Your diagnoses also included:  Breast Cancer (Hcc) Follow-up Information Follow up With Details Comments Contact Info Kenney Max MD Schedule an appointment as soon as possible for a visit in 1 week  [address] [facility] [address] [place] [address] 
[phone] Fabrice Radford MD Schedule an appointment as soon as possible for a visit in 2 days to evaluate hypertension (high blood pressure) [place] [address] [address] [facility] Internal Medicine [facility] 57 
[phone] Your Scheduled Appointments 2018  9:00 AM EDT  
POST OP with 815 MD Barby Bee [address] [place] [address] [address] [facility]  
[phone] Discharge Orders None A check elsy indicates which time of day the medication should be taken. My Medications START taking these medications Instructions Each Dose to Equal  
 Morning Noon Evening Bedtime  
 cephALEXin 250 mg capsule Commonly known as:  Polo [place] Your last dose was: Your next dose is: Take 2 Caps by mouth three (3) times daily for 7 days. 500 mg  
    
   
   
   
  
 diazePAM 5 mg tablet Commonly known as:  VALIUM Your last dose was: Your next dose is: Take 1 Tab by mouth every six (6) hours as needed (anxiety or muscle spasm). Max Daily Amount: 20 mg. Indications: Muscle Spasm 5 mg HYDROmorphone 2 mg tablet Commonly known as:  DILAUDID Your last dose was: Your next dose is: Take 1-2 Tabs by mouth every four (4) hours as needed. Max Daily Amount: 24 mg.  
 2-4 mg  
    
   
   
   
  
 naloxone 4 mg/actuation nasal spray Commonly known as:  [employer] Your last dose was: Your next dose is:    
   
   
 Use 1 spray intranasally into 1 nostril. Use a new Narcan nasal spray for subsequent doses and administer into alternating nostrils. May repeat every 2 to 3 minutes as needed. Indications: OPIATE-INDUCED RESPIRATORY DEPRESSION, Opioid Toxicity STOP taking these medications MOTRIN  mg tablet Generic drug:  ibuprofen Where to Get Your Medications Information on where to get these meds will be given to you by the nurse or doctor. ! Ask your nurse or doctor about these medications  
  cephALEXin 250 mg capsule  
 diazePAM 5 mg tablet HYDROmorphone 2 mg tablet  
 naloxone 4 mg/actuation nasal spray Opioid Education Prescription Opioids: What You Need to Know: 
 
Prescription opioids can be used to help relieve moderate-to-severe pain and are often prescribed following a surgery or injury, or for certain health conditions. These medications can be an important part of treatment but also come with serious risks. Opioids are strong pain medicines. Examples include hydrocodone, oxycodone, fentanyl, and morphine. Heroin is an example of an illegal opioid. It is important to work with your health care provider to make sure you are getting the safest, most effective care. WHAT ARE THE RISKS AND SIDE EFFECTS OF OPIOID USE? Prescription opioids carry serious risks of addiction and overdose, especially with prolonged use. An opioid overdose, often marked by slow breathing, can cause sudden death. The use of prescription opioids can have a number of side effects as well, even when taken as directed. · Tolerance-meaning you might need to take more of a medication for the same pain relief · Physical dependence-meaning you have symptoms of withdrawal when the medication is stopped. Withdrawal symptoms can include nausea, sweating, chills, diarrhea, stomach cramps, and muscle aches. Withdrawal can last up to several weeks, depending on which drug you took and how long you took it. · Increased sensitivity to pain · Constipation · Nausea, vomiting, and dry mouth · Sleepiness and dizziness · Confusion · Depression · Low levels of testosterone that can result in lower sex drive, energy, and strength · Itching and sweating RISKS ARE GREATER WITH:      
· History of drug misuse, substance use disorder, or overdose · Mental health conditions (such as depression or anxiety) · Sleep apnea · Older age (72 years or older) · Pregnancy Avoid alcohol while taking prescription opioids. Also, unless specifically advised by your health care provider, medications to avoid include: · Benzodiazepines (such as Xanax or Valium) · Muscle relaxants (such as Soma or Flexeril) · Hypnotics (such as Ambien or Lunesta) · Other prescription opioids KNOW YOUR OPTIONS Talk to your health care provider about ways to manage your pain that don't involve prescription opioids. Some of these options may actually work better and have fewer risks and side effects. Options may include: 
· Pain relievers such as acetaminophen, ibuprofen, and naproxen · Some medications that are also used for depression or seizures · Physical therapy and exercise · Counseling to help patients learn how to cope better with triggers of pain and stress. · Application of heat or cold compress · Massage therapy · Relaxation techniques Be Informed Make sure you know the name of your medication, how much and how often to take it, and its potential risks & side effects.  
 
IF YOU ARE PRESCRIBED OPIOIDS FOR PAIN: 
 · Never take opioids in greater amounts or more often than prescribed. Remember the goal is not to be pain-free but to manage your pain at a tolerable level. · Follow up with your primary care provider to: · Work together to create a plan on how to manage your pain. · Talk about ways to help manage your pain that don't involve prescription opioids. · Talk about any and all concerns and side effects. · Help prevent misuse and abuse. · Never sell or share prescription opioids · Help prevent misuse and abuse. · Store prescription opioids in a secure place and out of reach of others (this may include visitors, children, friends, and family). · Safely dispose of unused/unwanted prescription opioids: Find your community drug take-back program or your pharmacy mail-back program, or flush them down the toilet, following guidance from the Food and Drug Administration (www.fda.gov/Drugs/ResourcesForYou). · Visit www.cdc.gov/drugoverdose to learn about the risks of opioid abuse and overdose. · If you believe you may be struggling with addiction, tell your health care provider and ask for guidance or call [facility] All My Data at 9-036-404-LQAQ. Discharge Instructions Dougie Montemayor MD, FAAP, FACS [facility] [place], [facility] [phone] Tissue Expander Breast Reconstruction Post-operative Instructions 1. Surgical Bra:  You will be in a surgical bra following the procedure. This should be worn at all times except when showering for the first two weeks. The bra should be washed when it gets soiled. If the surgibra is irritating, you may place gauze pads between your skin and the bra for added comfort. After you are discharged home, you may wear a loose-fitting, sports-type bra that has NO UNDERWIRE. This should be worn day and night, except when showering, for the first month. 2. Surgical Drains:  Please refer to the YADI Drain Instructions sheet for details. 3. Activity:  Take it easy for the first several days. No cleaning, housework, or strenuous activity. Do not lift anything over 10 pounds, including children, and do not lift your hands over your head. You may resume non-vigorous activities at two weeks, then gently increase your activity as tolerated. No running, aerobics, or other strenuous activities until four weeks. You should avoid bench pressing or pec deck exercises in the future. 4. Bathing: You may shower two days after the surgery. NO tub baths, hot tubs, swimming, or any submersion in water for one week after removal of all YADI drains. Skin glue will be covering the incisions, just let the shower water run over them, then pat everything dry. Skin glue usually falls off on its own in about 1-2 weeks. You may remove it after two weeks if it is still present. 5. Medication:  You will receive prescriptions for an antibiotic, pain medication, and a muscle relaxant (valium). Take the antibiotic until it is finished, the valium as directed, and the pain medication as needed according to the directions. Avoid aspirin and non-steroidal anti-inflammatories (e.g. ibuprofen, advil, etc.) for two weeks after surgery. 6. Expansion:  The tissue expander will typically have some saline fluid placed in it at the time of surgery. The expansion process, to stretch the skin and create the new breast, is performed by filling the tissue expander during office visits. This usually begins approximately three weeks after your surgery. 7. Things to watch for: If you experience excessive or sudden swelling, spreading or increasing redness, increasing pain, foul-smelling drainage, separation of any incisions, fever, shaking chills, or any other concerns, please call the office immediately ([phone]). 8. Follow-up appointment: please call the office at [phone] during regular business hours to schedule an appointment in ***. Introducing [facility]! Milan Jaimes introduces [employer] patient portal. Now you can access parts of your medical record, email your doctor's office, and request medication refills online. 1. In your internet browser, go to [email or web address] 2. Click on the First Time User? Click Here link in the Sign In box. You will see the New Member Sign Up page. 3. Enter your [employer] Access Code exactly as it appears below. You will not need to use this code after youve completed the sign-up process. If you do not sign up before the expiration date, you must request a new code. · [employer] Access Code: [email or web address] Expires: 6/28/2018 12:32 PM 
 
4. Enter the last four digits of your Social Security Number (xxxx) and Date of Birth (mm/dd/yyyy) as indicated and click Submit. You will be taken to the next sign-up page. 5. Create a [employer] ID. This will be your [employer] login ID and cannot be changed, so think of one that is secure and easy to remember. 6. Create a [employer] password. You can change your password at any time. 7. Enter your Password Reset Question and Answer. This can be used at a later time if you forget your password. 8. Enter your e-mail address. You will receive e-mail notification when new information is available in [address] 9. Click Sign Up. You can now view and download portions of your medical record. 10. Click the Download Summary menu link to download a portable copy of your medical information. If you have questions, please visit the Frequently Asked Questions section of the [employer] website. Remember, [employer] is NOT to be used for urgent needs. For medical emergencies, dial 911. Now available from your iPhone and Android! Introducing Jose Eli As a [facility] patient, I wanted to make you aware of our electronic visit tool called Jose Eli. [employer] allows you to connect within minutes with a medical provider 24 hours a day, seven days a week via a mobile device or tablet or logging into a secure website from your computer. You can access Jose Koromafin from anywhere in the [place]. A virtual visit might be right for you when you have a simple condition and feel like you just dont want to get out of bed, or cant get away from work for an appointment, when your regular [facility] provider is not available (evenings, weekends or holidays), or when youre out of town and need minor care. Electronic visits cost only $49 and if the [employer] provider determines a prescription is needed to treat your condition, one can be electronically transmitted to a nearby pharmacy*. Please take a moment to enroll today if you have not already done so. The enrollment process is free and takes just a few minutes. To enroll, please download the [employer] saniya to your tablet or phone, or visit [email or web address] to enroll on your computer. And, as an [address] [place] [address] patient with a [employer] account, the results of your visits will be scanned into your electronic medical record and your primary care provider will be able to view the scanned results. We urge you to continue to see your regular [facility] provider for your ongoing medical care. And while your primary care provider may not be the one available when you seek a Jose Benavidezmichellefin virtual visit, the peace of mind you get from getting a real diagnosis real time can be priceless. For more information on Jose Pramodmichellefin, view our Frequently Asked Questions (FAQs) at [email or web address]. Sincerely, 
 
Shaq Kong MD 
Chief Medical Officer Noe Garcia *:  certain medications cannot be prescribed via Jose Eli Providers Seen During Your Hospitalization Provider Specialty Primary office phone Sandor Conley MD Breast Surgery [phone] Emelia Brush MD Plastic Surgery [phone] Your Primary Care Physician (PCP) Primary Care Physician Office Phone Office Fax Cara Gray [phone] You are allergic to the following Allergen Reactions Levaquin (Levofloxacin) Anaphylaxis Oxycodone Rash Recent Documentation Height Weight BMI OB Status Smoking Status 1.676 m 83.3 kg 29.64 kg/m2 Having regular periods Never Smoker Emergency Contacts Name Discharge Info Relation Home Work Mobile [facility]CHAR[facility] CAREGIVER [3] Spouse [3] [phone] CAREGIVER [3] Brother [24] [phone] Patient Belongings The following personal items are in your possession at time of discharge: 
  Dental Appliances: None  Visual Aid: Glasses, With patient             Clothing:  (in locker) Discharge Instructions Attachments/References SURGICAL DRAIN CARE (ENGLISH) Patient Handouts Surgical Drain Care: Care Instructions What is a surgical drain? After a surgery, fluid may collect inside your body in the surgical area. This makes an infection or other problems more likely. A surgical drain allows the fluid to flow out. The doctor will put a thin rubber tube into the area of your body where the fluid is likely to collect. The rubber tube will carry the fluid outside your body. The most common type of surgical drain carries the fluid into a collection bulb that you empty. This is called a Ethan-Clifton drain. The drain uses suction created by the bulb to pull the fluid from your body into the bulb.  
The rubber tube will probably be held in place by one or two stitches in your skin. Most people attach the bulb with a safety pin to clothing or near the bandage so that it doesn't flip around or pull on the stitches. When you first get the drain, the fluid will be bloody. It will change color from red to pink to a light yellow or clear as the wound heals and the fluid starts to go away. Your doctor may give you specific information on when you no longer need the drain and when it will be removed. In general, you will need the drain until you are collecting less than about 2 tablespoons of fluid in 24 hours. Follow-up care is a key part of your treatment and safety. Be sure to make and go to all appointments, and call your doctor if you are having problems. It's also a good idea to know your test results and keep a list of the medicines you take. How can you care for yourself at home? Fluid collection Follow any instructions your doctor gives you. How often you empty the bulb depends on how much fluid is draining. Empty the bulb when it is half full. To empty the bulb: 
· Wash your hands with soap and water. · Take the plug out of the bulb. · Empty the bulb. If your doctor asks you to measure the fluid, empty the fluid into a measuring cup, and write down the color and how much you collected. Your doctor will want to know this information. How often you empty the bulb depends on how much fluid there is. Doctors often suggest emptying it when it's about half full. · Clean the plug with alcohol. · Squeeze the bulb until it is flat. This removes all the air from the bulb. You may need to put the bulb on a table or a counter to flatten it. · Keep the bulb flat and put the plug in. · The bulb should stay flat after you put the plug back in. This creates the suction that pulls the fluid into the bulb. · Empty the fluid into the toilet. · Wash your hands. Bandage care You may have a bandage. Your doctor will tell you how often to change it. · Wash your hands with soap and water. · Take off the bandage from around the drain. · Clean the drain site and the skin around it with soap and water. Use gauze or a cotton swab. · When the site is dry, put on a new bandage. Drain care Squeezing or \"milking\" the tube can help prevent clogs so that it drains correctly. Your doctor will tell you when you need to do this. In general, you do this when: 
· You see a clot in the tube that is preventing fluid from draining. The clot may look like a dark, stringy lining. · You see fluid leaking around the tube where it goes into the skin. · You think there is no suction in the drain. To milk the tube: · Use one hand to hold and pinch the tube where it leaves the skin. · With the other hand, pinch the tube with your thumb and first finger just below where you're holding it. · Slowly and firmly push your thumb and first finger down the tubing toward the bulb. · Do this as many times as you need to. The clot should move down the tube and into the bulb. When should you call for help? Call your doctor now or seek immediate medical care if: 
? · You have signs of infection, such as: 
¨ Increased pain, swelling, warmth, or redness around the area. ¨ Red streaks leading from the area. ¨ Pus draining from the area. ¨ A fever. ? · You see a sudden change in the color or smell of the drainage. ? · The tube is coming loose where it leaves your skin. ? Watch closely for changes in your health, and be sure to contact your doctor if: 
? · You see a lot of fluid around the drain. ? · You cannot remove a clot from the tube by milking the tube. Where can you learn more? Go to http://stephen-ned.info/. Enter K117 in the search box to learn more about \"Surgical Drain Care: Care Instructions. \" Current as of: March 20, 2017 Content Version: 11.4 © 6311-5850 [employer].  Care instructions adapted under license by [address] (which disclaims liability or warranty for this information). If you have questions about a medical condition or this instruction, always ask your healthcare professional. [address] any warranty or liability for your use of this information. Please provide this summary of care documentation to your next provider. Signatures-by signing, you are acknowledging that this After Visit Summary has been reviewed with you and you have received a copy. Patient Signature:  ____________________________________________________________ Date:  ____________________________________________________________  
  
Jayton Luis Provider Signature:  ____________________________________________________________ Date:  ____________________________________________________________

## 2018-05-23 VITALS
HEART RATE: 68 BPM | SYSTOLIC BLOOD PRESSURE: 148 MMHG | WEIGHT: 183.64 LBS | TEMPERATURE: 98.3 F | DIASTOLIC BLOOD PRESSURE: 89 MMHG | HEIGHT: 66 IN | BODY MASS INDEX: 29.51 KG/M2 | OXYGEN SATURATION: 97 % | RESPIRATION RATE: 16 BRPM

## 2018-05-23 PROCEDURE — 77030011825 HC SUPP SURG PSTOP S2SG -B

## 2018-05-23 PROCEDURE — 74011250637 HC RX REV CODE- 250/637: Performed by: PLASTIC SURGERY

## 2018-05-23 PROCEDURE — 74011000258 HC RX REV CODE- 258: Performed by: PLASTIC SURGERY

## 2018-05-23 PROCEDURE — 99218 HC RM OBSERVATION: CPT

## 2018-05-23 PROCEDURE — 74011250636 HC RX REV CODE- 250/636: Performed by: PLASTIC SURGERY

## 2018-05-23 RX ORDER — NALOXONE HYDROCHLORIDE 4 MG/.1ML
SPRAY NASAL
Qty: 2 EACH | Refills: 0 | Status: ON HOLD | OUTPATIENT
Start: 2018-05-23 | End: 2018-06-26

## 2018-05-23 RX ORDER — DIAZEPAM 5 MG/1
5 TABLET ORAL
Qty: 30 TAB | Refills: 0 | Status: ON HOLD | OUTPATIENT
Start: 2018-05-23 | End: 2018-06-26

## 2018-05-23 RX ORDER — CEPHALEXIN 250 MG/1
500 CAPSULE ORAL 3 TIMES DAILY
Qty: 42 CAP | Refills: 0 | Status: SHIPPED | OUTPATIENT
Start: 2018-05-23 | End: 2018-05-30

## 2018-05-23 RX ORDER — HYDROMORPHONE HYDROCHLORIDE 2 MG/1
2-4 TABLET ORAL
Qty: 40 TAB | Refills: 0 | Status: ON HOLD | OUTPATIENT
Start: 2018-05-23 | End: 2018-06-26

## 2018-05-23 RX ADMIN — CEFAZOLIN SODIUM 1 G: 1 INJECTION, POWDER, FOR SOLUTION INTRAMUSCULAR; INTRAVENOUS at 07:17

## 2018-05-23 RX ADMIN — HYDROMORPHONE HYDROCHLORIDE 4 MG: 2 TABLET ORAL at 00:16

## 2018-05-23 RX ADMIN — HYDROMORPHONE HYDROCHLORIDE 4 MG: 2 TABLET ORAL at 09:06

## 2018-05-23 RX ADMIN — CEFAZOLIN SODIUM 1 G: 1 INJECTION, POWDER, FOR SOLUTION INTRAMUSCULAR; INTRAVENOUS at 00:10

## 2018-05-23 RX ADMIN — HYDROMORPHONE HYDROCHLORIDE 4 MG: 2 TABLET ORAL at 05:03

## 2018-05-23 NOTE — PROGRESS NOTES
Pod 1 right mastectomy, sln biopsy. Patient doing well. Tolerating po. Pain controlled    Visit Vitals    /79 (BP 1 Location: Left arm, BP Patient Position: At rest)    Pulse 78    Temp 98.2 °F (36.8 °C)    Resp 16    Ht 5' 6\" (1.676 m)    Wt 183 lb 10.3 oz (83.3 kg)    LMP 05/01/2018 (Approximate)    SpO2 100%    BMI 29.64 kg/m2       Date 05/22/18 0700 - 05/23/18 0659 05/23/18 0700 - 05/24/18 0659   Shift 0637-6725 0391-0019 24 Hour Total 1456-7869 3726-3556 24 Hour Total   I  N  T  A  K  E   I.V.  (mL/kg/hr) 1500  (1.5) 313.3  (0.3) 1813.3  (0.9)         Volume (lactated Ringers infusion) 1500  1500         Volume (dextrose 5% - 0.45% NaCl with KCl 20 mEq/L infusion)  313.3 313.3       Shift Total  (mL/kg) 1500  (18) 313.3  (3.8) 1813.3  (21.8)      O  U  T  P  U  T   Urine  (mL/kg/hr) 1800  (1.8) 1600  (1.6) 3400  (1.7)         Urine Voided 1800 1600 3400       Drains 110 60 170         Output (ml) (Drain 15 F DRAIN 05/22/18 Right Other (comment)) 110 60 170       Other 250  250         Other Output 250  250       Blood 225  225         Estimated Blood Loss 225  225       Shift Total  (mL/kg) 2385  (28.6) 1660  (19.9) 4045  (48.6)      NET -885 -1346.7 -2231.7      Weight (kg) 83.3 83.3 83.3 83.3 83.3 83.3     Exam:  Right breast incision c/d/i. No hematoma. Drain intact sanguinous fluid. A/p  1. Dc home  2. Will call with path.

## 2018-05-23 NOTE — BRIEF OP NOTE
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  Postoperative Diagnosis: RIGHT BREAST DUCTAL CARCINOMA IN SITU    Procedure(s):  RIGHT BREAST MASTECTOMY, RIGHT BREAST SENTINEL NODE BIOPSY AND RIGHT BREAST RECONSTRUCTION WITH TISSUE EXPANDER AND ALLODERM  SENTINEL NODE BIOPSY  RIGHT BREAST RECONSTRUCTION WITH ALLODERM AND TISSUE EXPANDER  Surgeon(s) and Role:  Panel 1:     * Jaskaran Weaver MD - Primary    Panel 2:     * Shelli Sunshine MD - Primary         Surgical Assistant: Mercy Wade    Surgical Staff:  Shar Rojas: Lucienne Prader, RN  Circ-Relief: Sharon Pettit RN  Registered Nurse Assistant: Berny Block RN  Scrub RN-1: Kimmy Velazquez RN  Event Time In   Incision Start 0809   Incision Close 1035     Anesthesia: General   Estimated Blood Loss: 150 ml  Specimens:   ID Type Source Tests Collected by Time Destination   1 : RIGHT AXILLARY SENTINEL NODE #1 Oriana Kline MD 5/22/2018 0830 Pathology   2 : RIGHT AXILLARY SENTINEL NODE #2 Frozen Section Axilla  81 Aimee Weaver MD 5/22/2018 3601 Pathology   3 : right breast tail of breast node Frozen Section Axilla  81 Aimee Weaver MD 5/22/2018 7296 Pathology   4 : right breast mastectomy Fresh Breast  [place] Kerline Cole MD 5/22/2018 3158 Pathology      Findings: sln negative  Complications: none  Implants:   Implant Name Type Inv.  Item Serial No.  Lot No. LRB No. Used Action   GRAFT TISS ALLODERM [id number] -- THICK 128 SQ CM - SN/A  GRAFT TISS ALLODERM [id number] -- THICK 128 SQ CM N/A [employer] [id number] Right 1 Implanted   NATRELLE ALLERGAN 400CC TISSUE EXPANDER Other   [id number]   N/A Right 1 Implanted       Dictated [id number]

## 2018-05-23 NOTE — PROGRESS NOTES
Nacho Marla is doing well this AM. She denies nausea or emesis. Hypertension  AF  Right breast flaps are pink  No infection or hematoma  Right upper chest/axillary ecchymosis  YADI sanguinous drainage about 130 cc total    A/P: POD 1 s/p right breast tissue expander, stable  1. Follow up with PCP for HTN  2. D/C planning  3.  Keep YADI drain and follow up in 1 week

## 2018-05-23 NOTE — OP NOTES
[address] [place] [address] REPORT    Nydia Aquino  MR#: [id number]  : 1980  ACCOUNT #: [de-identified]   DATE OF SERVICE: 2018    PREOPERATIVE DIAGNOSIS:  Right breast ductal carcinoma in situ, multicentric. POSTOPERATIVE DIAGNOSIS:  Right breast ductal carcinoma in situ, multicentric. PROCEDURE PERFORMED:  Right breast skin sparing mastectomy, right sentinel node biopsy. SURGEON:  Vincent Lamb MD    ASSISTANT:  Mitzi Hester    ANESTHESIA:  General.    ESTIMATED BLOOD LOSS:  150 mL. SPECIMENS REMOVED:   1. Right axillary sentinel node #1.  2.  Right axillary sentinel node #2.  3.  Right breast tail nodule. 4.  Right breast mastectomy. FINDINGS:  Reynolds lymph node negative. COMPLICATIONS:  None. IMPLANTS:    No implants in my part of the procedure. INDICATION FOR PROCEDURE:  A 42-year-old female with biopsy proven multicentric DCIS. She was scheduled for a skin-sparing mastectomy with sentinel lymph node biopsy. PROCEDURE IN DETAIL:  The patient initially went to nuclear medicine where technetium 99 was injected in the right breast.  She tolerated this well. She went to the preop holding area where surgical site was marked by both surgeons. Informed consent was obtained. She was taken to the operating room and laid in supine position where general endotracheal anesthesia was induced. Right breast prepped and draped in usual fashion. Timeout was performed. A periareolar incision was made with a 10 blade. Bovie cautery was used to dissect the superior skin flap over to the right axilla. Axillary fascia was incised. While dissecting through the breast tissue a firm nodule was felt in the tail of the breast and this was questionable if this was a lymph node or not. This was sent for frozen specimen, as well. Two sentinel nodes were identified with Neoprobe guidance and excised using the LigaSure. These were found to be negative.   The nodule in the tail of the breast was actually a foci of carcinoma and not an actual lymph node. Next, medial, inferior and lateral skin flaps were dissected with Bovie cautery. The breast was removed in total from the chest wall in a medial to lateral fashion and sent for permanent pathology and marked short superior, long stitch lateral.  Hemostasis was obtained with Bovie cautery. A liter of saline was used to irrigate the breast tissue and a moist lap was packed in the cavity in preparation for Dr. Jerica Rooney' part of the procedure. All sponge needle and instrument counts were correct. The patient was stable during this time.       MD JAIME Valdez / [place]  D: 05/23/2018 08:10     T: 05/23/2018 09:07  JOB #: [id number]

## 2018-05-23 NOTE — PROGRESS NOTES
I have reviewed discharge instructions and prescriptions with the patient and spouse. The patient and spouse verbalized understanding. Pt. Was given extra surgical bra, ABD pads, measuring cup, and drain pouches. Pt. Stable and discharged home with .

## 2018-05-23 NOTE — DISCHARGE INSTRUCTIONS
Dougie Hodgson MD, [facility]  [phone]    Tissue Expander Breast Reconstruction Post-operative Instructions      1. Surgical Bra:  You will be in a surgical bra following the procedure. This should be worn at all times except when showering for the first two weeks. The bra should be washed when it gets soiled. If the surgibra is irritating, you may place gauze pads between your skin and the bra for added comfort. After you are discharged home, you may wear a loose-fitting, sports-type bra that has NO UNDERWIRE. This should be worn day and night, except when showering, for the first month. 2. Surgical Drains:  Please refer to the YADI Drain Instructions sheet for details. 3. Activity:  Take it easy for the first several days. No cleaning, housework, or strenuous activity. Do not lift anything over 10 pounds, including children, and do not lift your hands over your head. You may resume non-vigorous activities at two weeks, then gently increase your activity as tolerated. No running, aerobics, or other strenuous activities until four weeks. You should avoid bench pressing or pec deck exercises in the future. 4. Bathing: You may shower two days after the surgery. NO tub baths, hot tubs, swimming, or any submersion in water for one week after removal of all YADI drains. Skin glue will be covering the incisions, just let the shower water run over them, then pat everything dry. Skin glue usually falls off on its own in about 1-2 weeks. You may remove it after two weeks if it is still present. 5. Medication:  You will receive prescriptions for an antibiotic, pain medication, and a muscle relaxant (valium). Take the antibiotic until it is finished, the valium as directed, and the pain medication as needed according to the directions.   Avoid aspirin and non-steroidal anti-inflammatories (e.g. ibuprofen, advil, etc.) for two weeks after surgery. 6. Expansion:  The tissue expander will typically have some saline fluid placed in it at the time of surgery. The expansion process, to stretch the skin and create the new breast, is performed by filling the tissue expander during office visits. This usually begins approximately three weeks after your surgery. 7. Things to watch for: If you experience excessive or sudden swelling, spreading or increasing redness, increasing pain, foul-smelling drainage, separation of any incisions, fever, shaking chills, or any other concerns, please call the office immediately ([phone]). 8. Follow-up appointment: please call the office at [phone] during regular business hours to schedule an appointment in 1 week.

## 2018-05-23 NOTE — PROGRESS NOTES
Bedside and Verbal shift change report given to Ariane Garcia RN (oncoming nurse) by Samia Carreon RN (offgoing nurse). Report included the following information SBAR, Kardex, Accordion and Med Rec Status.

## 2018-05-23 NOTE — PROGRESS NOTES
Bedside and Verbal shift change report given to SUSAN Lockett (oncoming nurse) by Brandon Marie RN (offgoing nurse). Report included the following information SBAR, Kardex, OR Summary, Procedure Summary, Intake/Output, MAR, Accordion and Recent Results.

## 2018-05-29 DIAGNOSIS — D05.11 DUCTAL CARCINOMA IN SITU (DCIS) OF RIGHT BREAST: Primary | ICD-10-CM

## 2018-06-01 ENCOUNTER — OFFICE VISIT (OUTPATIENT)
Dept: SURGERY | Age: 38
End: 2018-06-01

## 2018-06-01 VITALS — SYSTOLIC BLOOD PRESSURE: 152 MMHG | DIASTOLIC BLOOD PRESSURE: 103 MMHG | HEART RATE: 69 BPM

## 2018-06-01 DIAGNOSIS — C50.811 CANCER OF OVERLAPPING SITES OF RIGHT BREAST (HCC): Primary | ICD-10-CM

## 2018-06-01 NOTE — PROGRESS NOTES
HISTORY OF PRESENT ILLNESS  Natalie Sanches is a 44 y.o. female. HPI  ESTABLISHED patient here for post op follow up, s/p RIGHT mastectomy, SNBx, reconstruction with tissue Expander. Patient is doing well. Continues to have pain and taking her pain medication. Followed up with Dr. Daxa Felipe yesterday and drains were removed. Patient feels as if she is healing well. 43 yo female with multifocal dcis er/pr negative, high grade with comedonecrosis. 5/22/18 - RIGHT mastectomy, SNbx, with reconstruction, TE - pathology showed IDC 8 mm and DCIS, ER 0%, RI 0%, HER2 3+  Separate nodule 8 mm resected separately, not true + margin. Review of Systems   All other systems reviewed and are negative. Physical Exam   Pulmonary/Chest:       Right breast surgically absent. Expander intact. Drain removed already. Nursing note and vitals reviewed. ASSESSMENT and PLAN    ICD-10-CM ICD-9-CM    1. Cancer of overlapping sites of right breast ([facility] C50.811 174.8      43 yo multicentric dcis and idc, grade 3, stage 1 (8 mm) er/pr negative, her 2 orion 3+. S/p mastectomy, sln biopsy. - refer to med onc for chemo, her 2 blockade   - will need port. - continue to follow-up with Dr. Daxa Felipe.    - no xrt

## 2018-06-11 ENCOUNTER — TELEPHONE (OUTPATIENT)
Dept: SURGERY | Age: 38
End: 2018-06-11

## 2018-06-11 NOTE — TELEPHONE ENCOUNTER
Patient's  called and wanted to let us know that Dr. Sophie Escudero requested a port inserted for his wife. I notified Dr. Mary Anthony who placed an order. I called patient's  back to let him know that a surgery scheduler will call them back to get the surgery scheduled. He also wanted to know what kind of deodorant she should use now. I let him know as long as she is well healed from surgery she can use any deodorant. He was appreciative of return phone call.

## 2018-06-13 DIAGNOSIS — C50.811 MALIGNANT NEOPLASM OF OVERLAPPING SITES OF RIGHT FEMALE BREAST, UNSPECIFIED ESTROGEN RECEPTOR STATUS (HCC): Primary | ICD-10-CM

## 2018-06-22 LAB — EF %, EXTERNAL: NORMAL

## 2018-06-25 ENCOUNTER — ANESTHESIA EVENT (OUTPATIENT)
Dept: MEDSURG UNIT | Age: 38
End: 2018-06-25
Payer: COMMERCIAL

## 2018-06-26 ENCOUNTER — APPOINTMENT (OUTPATIENT)
Dept: GENERAL RADIOLOGY | Age: 38
End: 2018-06-26
Attending: SURGERY
Payer: COMMERCIAL

## 2018-06-26 ENCOUNTER — HOSPITAL ENCOUNTER (OUTPATIENT)
Age: 38
Setting detail: OUTPATIENT SURGERY
Discharge: HOME | End: 2018-06-26
Attending: SURGERY | Admitting: SURGERY
Payer: COMMERCIAL

## 2018-06-26 ENCOUNTER — ANESTHESIA (OUTPATIENT)
Dept: MEDSURG UNIT | Age: 38
End: 2018-06-26
Payer: COMMERCIAL

## 2018-06-26 VITALS
HEIGHT: 67 IN | BODY MASS INDEX: 27.47 KG/M2 | HEART RATE: 80 BPM | OXYGEN SATURATION: 97 % | WEIGHT: 175 LBS | SYSTOLIC BLOOD PRESSURE: 165 MMHG | TEMPERATURE: 97.7 F | RESPIRATION RATE: 17 BRPM | DIASTOLIC BLOOD PRESSURE: 85 MMHG

## 2018-06-26 DIAGNOSIS — C50.811 MALIGNANT NEOPLASM OF OVERLAPPING SITES OF RIGHT FEMALE BREAST, UNSPECIFIED ESTROGEN RECEPTOR STATUS (HCC): ICD-10-CM

## 2018-06-26 LAB
HCG UR QL: NEGATIVE
HGB BLD-MCNC: 15.4 G/DL (ref 11.5–16)

## 2018-06-26 PROCEDURE — 74011250636 HC RX REV CODE- 250/636: Performed by: SURGERY

## 2018-06-26 PROCEDURE — 85018 HEMOGLOBIN: CPT

## 2018-06-26 PROCEDURE — 77030039266 HC ADH SKN EXOFIN S2SG -A: Performed by: SURGERY

## 2018-06-26 PROCEDURE — 81025 URINE PREGNANCY TEST: CPT

## 2018-06-26 PROCEDURE — 77030002986 HC SUT PROL J&J -A: Performed by: SURGERY

## 2018-06-26 PROCEDURE — 71045 X-RAY EXAM CHEST 1 VIEW: CPT

## 2018-06-26 PROCEDURE — 77030011267 HC ELECTRD BLD COVD -A: Performed by: SURGERY

## 2018-06-26 PROCEDURE — 76030000001 HC AMB SURG OR TIME 1 TO 1.5: Performed by: SURGERY

## 2018-06-26 PROCEDURE — 74011000258 HC RX REV CODE- 258: Performed by: SURGERY

## 2018-06-26 PROCEDURE — 77030020256 HC SOL INJ NACL 0.9%  500ML: Performed by: SURGERY

## 2018-06-26 PROCEDURE — 76000 FLUOROSCOPY <1 HR PHYS/QHP: CPT

## 2018-06-26 PROCEDURE — 74011250636 HC RX REV CODE- 250/636

## 2018-06-26 PROCEDURE — 77030011640 HC PAD GRND REM COVD -A: Performed by: SURGERY

## 2018-06-26 PROCEDURE — 77030020782 HC GWN BAIR PAWS FLX 3M -B

## 2018-06-26 PROCEDURE — 74011000250 HC RX REV CODE- 250: Performed by: SURGERY

## 2018-06-26 PROCEDURE — 77030002933 HC SUT MCRYL J&J -A: Performed by: SURGERY

## 2018-06-26 PROCEDURE — 76060000062 HC AMB SURG ANES 1 TO 1.5 HR: Performed by: SURGERY

## 2018-06-26 PROCEDURE — 74011000250 HC RX REV CODE- 250

## 2018-06-26 PROCEDURE — 77030002996 HC SUT SLK J&J -A: Performed by: SURGERY

## 2018-06-26 PROCEDURE — 76210000034 HC AMBSU PH I REC 0.5 TO 1 HR: Performed by: SURGERY

## 2018-06-26 PROCEDURE — 77030031139 HC SUT VCRL2 J&J -A: Performed by: SURGERY

## 2018-06-26 PROCEDURE — C1788 PORT, INDWELLING, IMP: HCPCS | Performed by: SURGERY

## 2018-06-26 DEVICE — SYSTEM INFUS PRT CATH 8FR L66CM INTRO 8FR CHST TI SGL LUMN: Type: IMPLANTABLE DEVICE | Site: CHEST  WALL | Status: FUNCTIONAL

## 2018-06-26 RX ORDER — SODIUM CHLORIDE 9 MG/ML
50 INJECTION, SOLUTION INTRAVENOUS CONTINUOUS
Status: DISCONTINUED | OUTPATIENT
Start: 2018-06-26 | End: 2018-06-26 | Stop reason: HOSPADM

## 2018-06-26 RX ORDER — SODIUM CHLORIDE, SODIUM LACTATE, POTASSIUM CHLORIDE, CALCIUM CHLORIDE 600; 310; 30; 20 MG/100ML; MG/100ML; MG/100ML; MG/100ML
INJECTION, SOLUTION INTRAVENOUS
Status: DISCONTINUED | OUTPATIENT
Start: 2018-06-26 | End: 2018-06-26 | Stop reason: HOSPADM

## 2018-06-26 RX ORDER — SODIUM CHLORIDE 9 MG/ML
25 INJECTION, SOLUTION INTRAVENOUS CONTINUOUS
Status: DISCONTINUED | OUTPATIENT
Start: 2018-06-26 | End: 2018-06-26 | Stop reason: HOSPADM

## 2018-06-26 RX ORDER — SODIUM CHLORIDE 0.9 % (FLUSH) 0.9 %
5-10 SYRINGE (ML) INJECTION EVERY 8 HOURS
Status: DISCONTINUED | OUTPATIENT
Start: 2018-06-26 | End: 2018-06-26 | Stop reason: HOSPADM

## 2018-06-26 RX ORDER — FENTANYL CITRATE 50 UG/ML
25 INJECTION, SOLUTION INTRAMUSCULAR; INTRAVENOUS
Status: DISCONTINUED | OUTPATIENT
Start: 2018-06-26 | End: 2018-06-26 | Stop reason: HOSPADM

## 2018-06-26 RX ORDER — FENTANYL CITRATE 50 UG/ML
INJECTION, SOLUTION INTRAMUSCULAR; INTRAVENOUS AS NEEDED
Status: DISCONTINUED | OUTPATIENT
Start: 2018-06-26 | End: 2018-06-26 | Stop reason: HOSPADM

## 2018-06-26 RX ORDER — SODIUM CHLORIDE, SODIUM LACTATE, POTASSIUM CHLORIDE, CALCIUM CHLORIDE 600; 310; 30; 20 MG/100ML; MG/100ML; MG/100ML; MG/100ML
75 INJECTION, SOLUTION INTRAVENOUS CONTINUOUS
Status: DISCONTINUED | OUTPATIENT
Start: 2018-06-26 | End: 2018-06-26 | Stop reason: HOSPADM

## 2018-06-26 RX ORDER — HYDROCODONE BITARTRATE AND ACETAMINOPHEN 5; 325 MG/1; MG/1
1 TABLET ORAL AS NEEDED
Status: DISCONTINUED | OUTPATIENT
Start: 2018-06-26 | End: 2018-06-26 | Stop reason: HOSPADM

## 2018-06-26 RX ORDER — ONDANSETRON 2 MG/ML
4 INJECTION INTRAMUSCULAR; INTRAVENOUS AS NEEDED
Status: DISCONTINUED | OUTPATIENT
Start: 2018-06-26 | End: 2018-06-26 | Stop reason: HOSPADM

## 2018-06-26 RX ORDER — MORPHINE SULFATE 10 MG/ML
2 INJECTION, SOLUTION INTRAMUSCULAR; INTRAVENOUS
Status: DISCONTINUED | OUTPATIENT
Start: 2018-06-26 | End: 2018-06-26 | Stop reason: HOSPADM

## 2018-06-26 RX ORDER — SODIUM CHLORIDE 0.9 % (FLUSH) 0.9 %
5-10 SYRINGE (ML) INJECTION AS NEEDED
Status: DISCONTINUED | OUTPATIENT
Start: 2018-06-26 | End: 2018-06-26 | Stop reason: HOSPADM

## 2018-06-26 RX ORDER — SODIUM CHLORIDE 9 MG/ML
INJECTION, SOLUTION INTRAVENOUS
Status: DISCONTINUED | OUTPATIENT
Start: 2018-06-26 | End: 2018-06-26 | Stop reason: HOSPADM

## 2018-06-26 RX ORDER — CEFAZOLIN SODIUM 2 G/50ML
2 SOLUTION INTRAVENOUS ONCE
Status: COMPLETED | OUTPATIENT
Start: 2018-06-26 | End: 2018-06-26

## 2018-06-26 RX ORDER — HEPARIN 100 UNIT/ML
SYRINGE INTRAVENOUS AS NEEDED
Status: DISCONTINUED | OUTPATIENT
Start: 2018-06-26 | End: 2018-06-26 | Stop reason: HOSPADM

## 2018-06-26 RX ORDER — PROPOFOL 10 MG/ML
INJECTION, EMULSION INTRAVENOUS
Status: DISCONTINUED | OUTPATIENT
Start: 2018-06-26 | End: 2018-06-26 | Stop reason: HOSPADM

## 2018-06-26 RX ORDER — MIDAZOLAM HYDROCHLORIDE 1 MG/ML
1 INJECTION, SOLUTION INTRAMUSCULAR; INTRAVENOUS AS NEEDED
Status: DISCONTINUED | OUTPATIENT
Start: 2018-06-26 | End: 2018-06-26 | Stop reason: HOSPADM

## 2018-06-26 RX ORDER — MIDAZOLAM HYDROCHLORIDE 1 MG/ML
INJECTION, SOLUTION INTRAMUSCULAR; INTRAVENOUS AS NEEDED
Status: DISCONTINUED | OUTPATIENT
Start: 2018-06-26 | End: 2018-06-26 | Stop reason: HOSPADM

## 2018-06-26 RX ORDER — SODIUM CHLORIDE, SODIUM LACTATE, POTASSIUM CHLORIDE, CALCIUM CHLORIDE 600; 310; 30; 20 MG/100ML; MG/100ML; MG/100ML; MG/100ML
125 INJECTION, SOLUTION INTRAVENOUS CONTINUOUS
Status: DISCONTINUED | OUTPATIENT
Start: 2018-06-26 | End: 2018-06-26 | Stop reason: HOSPADM

## 2018-06-26 RX ORDER — FENTANYL CITRATE 50 UG/ML
50 INJECTION, SOLUTION INTRAMUSCULAR; INTRAVENOUS AS NEEDED
Status: DISCONTINUED | OUTPATIENT
Start: 2018-06-26 | End: 2018-06-26 | Stop reason: HOSPADM

## 2018-06-26 RX ORDER — ACETAMINOPHEN 500 MG
500 TABLET ORAL
COMMUNITY
End: 2018-11-06

## 2018-06-26 RX ORDER — LIDOCAINE HYDROCHLORIDE 10 MG/ML
0.1 INJECTION, SOLUTION EPIDURAL; INFILTRATION; INTRACAUDAL; PERINEURAL AS NEEDED
Status: DISCONTINUED | OUTPATIENT
Start: 2018-06-26 | End: 2018-06-26 | Stop reason: HOSPADM

## 2018-06-26 RX ORDER — DIPHENHYDRAMINE HYDROCHLORIDE 50 MG/ML
12.5 INJECTION, SOLUTION INTRAMUSCULAR; INTRAVENOUS AS NEEDED
Status: DISCONTINUED | OUTPATIENT
Start: 2018-06-26 | End: 2018-06-26 | Stop reason: HOSPADM

## 2018-06-26 RX ORDER — ONDANSETRON 2 MG/ML
INJECTION INTRAMUSCULAR; INTRAVENOUS AS NEEDED
Status: DISCONTINUED | OUTPATIENT
Start: 2018-06-26 | End: 2018-06-26 | Stop reason: HOSPADM

## 2018-06-26 RX ORDER — PROPOFOL 10 MG/ML
INJECTION, EMULSION INTRAVENOUS AS NEEDED
Status: DISCONTINUED | OUTPATIENT
Start: 2018-06-26 | End: 2018-06-26 | Stop reason: HOSPADM

## 2018-06-26 RX ORDER — MIDAZOLAM HYDROCHLORIDE 1 MG/ML
0.5 INJECTION, SOLUTION INTRAMUSCULAR; INTRAVENOUS
Status: DISCONTINUED | OUTPATIENT
Start: 2018-06-26 | End: 2018-06-26 | Stop reason: HOSPADM

## 2018-06-26 RX ORDER — DEXMEDETOMIDINE HYDROCHLORIDE 4 UG/ML
INJECTION, SOLUTION INTRAVENOUS AS NEEDED
Status: DISCONTINUED | OUTPATIENT
Start: 2018-06-26 | End: 2018-06-26 | Stop reason: HOSPADM

## 2018-06-26 RX ADMIN — SODIUM CHLORIDE, SODIUM LACTATE, POTASSIUM CHLORIDE, CALCIUM CHLORIDE: 600; 310; 30; 20 INJECTION, SOLUTION INTRAVENOUS at 09:20

## 2018-06-26 RX ADMIN — PROPOFOL 50 MG: 10 INJECTION, EMULSION INTRAVENOUS at 09:37

## 2018-06-26 RX ADMIN — FENTANYL CITRATE 50 MCG: 50 INJECTION, SOLUTION INTRAMUSCULAR; INTRAVENOUS at 09:33

## 2018-06-26 RX ADMIN — PROPOFOL 50 MG: 10 INJECTION, EMULSION INTRAVENOUS at 09:42

## 2018-06-26 RX ADMIN — DEXMEDETOMIDINE HYDROCHLORIDE 4 MCG: 4 INJECTION, SOLUTION INTRAVENOUS at 09:27

## 2018-06-26 RX ADMIN — DEXMEDETOMIDINE HYDROCHLORIDE 2 MCG: 4 INJECTION, SOLUTION INTRAVENOUS at 09:44

## 2018-06-26 RX ADMIN — PROPOFOL 50 MG: 10 INJECTION, EMULSION INTRAVENOUS at 10:06

## 2018-06-26 RX ADMIN — PROPOFOL 50 MG: 10 INJECTION, EMULSION INTRAVENOUS at 10:01

## 2018-06-26 RX ADMIN — FENTANYL CITRATE 50 MCG: 50 INJECTION, SOLUTION INTRAMUSCULAR; INTRAVENOUS at 09:26

## 2018-06-26 RX ADMIN — DEXMEDETOMIDINE HYDROCHLORIDE 4 MCG: 4 INJECTION, SOLUTION INTRAVENOUS at 09:29

## 2018-06-26 RX ADMIN — ONDANSETRON 4 MG: 2 INJECTION INTRAMUSCULAR; INTRAVENOUS at 10:15

## 2018-06-26 RX ADMIN — DEXMEDETOMIDINE HYDROCHLORIDE 4 MCG: 4 INJECTION, SOLUTION INTRAVENOUS at 09:35

## 2018-06-26 RX ADMIN — MIDAZOLAM HYDROCHLORIDE 5 MG: 1 INJECTION, SOLUTION INTRAMUSCULAR; INTRAVENOUS at 09:25

## 2018-06-26 RX ADMIN — CEFAZOLIN SODIUM 2 G: 2 SOLUTION INTRAVENOUS at 09:26

## 2018-06-26 RX ADMIN — DEXMEDETOMIDINE HYDROCHLORIDE 4 MCG: 4 INJECTION, SOLUTION INTRAVENOUS at 09:38

## 2018-06-26 RX ADMIN — PROPOFOL 50 MG: 10 INJECTION, EMULSION INTRAVENOUS at 09:26

## 2018-06-26 RX ADMIN — PROPOFOL 100 MCG/KG/MIN: 10 INJECTION, EMULSION INTRAVENOUS at 09:26

## 2018-06-26 RX ADMIN — DEXMEDETOMIDINE HYDROCHLORIDE 4 MCG: 4 INJECTION, SOLUTION INTRAVENOUS at 09:41

## 2018-06-26 RX ADMIN — DEXMEDETOMIDINE HYDROCHLORIDE 4 MCG: 4 INJECTION, SOLUTION INTRAVENOUS at 09:32

## 2018-06-26 RX ADMIN — DEXMEDETOMIDINE HYDROCHLORIDE 4 MCG: 4 INJECTION, SOLUTION INTRAVENOUS at 09:25

## 2018-06-26 RX ADMIN — PROPOFOL 50 MG: 10 INJECTION, EMULSION INTRAVENOUS at 09:30

## 2018-06-26 NOTE — ANESTHESIA POSTPROCEDURE EVALUATION
Post-Anesthesia Evaluation and Assessment    Patient: Sandra Tenorio MRN: [id number]  SSN: [id number]    YOB: 1980  Age: 44 y.o. Sex: female       Cardiovascular Function/Vital Signs  Visit Vitals    /79    Pulse 81    Temp 36.5 °C (97.7 °F)    Resp 23    Ht 5' 7\" (1.702 m)    Wt 79.4 kg (175 lb)    SpO2 96%    BMI 27.41 kg/m2       Patient is status post MAC anesthesia for Procedure(s):  PORTACATH INSERTION. Nausea/Vomiting: None    Postoperative hydration reviewed and adequate. Pain:  Pain Scale 1: Numeric (0 - 10) (06/26/18 0754)  Pain Intensity 1: 0 (06/26/18 0754)   Managed    Neurological Status:   Neuro (WDL): Within Defined Limits (06/26/18 0726)   At baseline    Mental Status and Level of Consciousness: Arousable    Pulmonary Status:   O2 Device: CO2 nasal cannula (06/26/18 1039)   Adequate oxygenation and airway patent    Complications related to anesthesia: None    Post-anesthesia assessment completed.  No concerns    Signed By: Marilee Nickerson MD     June 26, 2018

## 2018-06-26 NOTE — DISCHARGE INSTRUCTIONS
Discharge Instructions from Dr. Wayne Villavicencio    ·   · You may shower, but no hot tubs, swimming pools, or baths until your incision is healed. · No heavy lifting with the affected extremity (nothing greater than 5 pounds), and limit its use for the next 4-5 days. · You may use an ice pack for comfort for the next couple of days, but do not place ice directly on the skin. Rather, use a towel or clothing to serve as a barrier between skin and ice to prevent injury. · If I placed a drain, follow the drain instructions provided, especially as you keep a record of the drain output. · Follow medication instructions carefully. No aspirin, ibuprofen or aleve x 7 days. · You will have bruising and swelling  · Watch for signs of infection as listed below. · Redness  · Swelling  · Drainage from the incision or from your nipple that appears infected  · Fever over 101.5 degrees for consecutive readings, or over 99.5 if you are currently undergoing chemotherapy. · Call our office (number is below) for a follow-up appointment. · If you have any problems, our phone number is [phone]        DISCHARGE SUMMARY from Nurse    PATIENT INSTRUCTIONS:    After general anesthesia or intravenous sedation, for 24 hours or while taking prescription Narcotics:  · Limit your activities  · Do not drive and operate hazardous machinery  · Do not make important personal or business decisions  · Do  not drink alcoholic beverages  · If you have not urinated within 8 hours after discharge, please contact your surgeon on call.     Report the following to your surgeon:  · Excessive pain, swelling, redness or odor of or around the surgical area  · Temperature over 100.5  · Nausea and vomiting lasting longer than 4 hours or if unable to take medications  · Any signs of decreased circulation or nerve impairment to extremity: change in color, persistent  numbness, tingling, coldness or increase pain  · Any questions    What to do at Home:  Recommended activity: See surgical instructions,     If you experience any of the following symptoms as instructed, please follow up with Dr Fatou Grubbs.    *  Please give a list of your current medications to your Primary Care Provider. *  Please update this list whenever your medications are discontinued, doses are      changed, or new medications (including over-the-counter products) are added. *  Please carry medication information at all times in case of emergency situations. These are general instructions for a healthy lifestyle:    No smoking/ No tobacco products/ Avoid exposure to second hand smoke  Surgeon General's Warning:  Quitting smoking now greatly reduces serious risk to your health. Obesity, smoking, and sedentary lifestyle greatly increases your risk for illness    A healthy diet, regular physical exercise & weight monitoring are important for maintaining a healthy lifestyle    You may be retaining fluid if you have a history of heart failure or if you experience any of the following symptoms:  Weight gain of 3 pounds or more overnight or 5 pounds in a week, increased swelling in our hands or feet or shortness of breath while lying flat in bed. Please call your doctor as soon as you notice any of these symptoms; do not wait until your next office visit. Recognize signs and symptoms of STROKE:    F-face looks uneven    A-arms unable to move or move unevenly    S-speech slurred or non-existent    T-time-call 911 as soon as signs and symptoms begin-DO NOT go       Back to bed or wait to see if you get better-TIME IS BRAIN. Warning Signs of HEART ATTACK     Call 911 if you have these symptoms:   Chest discomfort. Most heart attacks involve discomfort in the center of the chest that lasts more than a few minutes, or that goes away and comes back. It can feel like uncomfortable pressure, squeezing, fullness, or pain.  Discomfort in other areas of the upper body.  Symptoms can include pain or discomfort in one or both arms, the back, neck, jaw, or stomach.  Shortness of breath with or without chest discomfort.  Other signs may include breaking out in a cold sweat, nausea, or lightheadedness. Don't wait more than five minutes to call 911 - MINUTES MATTER! Fast action can save your life. Calling 911 is almost always the fastest way to get lifesaving treatment. Emergency Medical Services staff can begin treatment when they arrive -- up to an hour sooner than if someone gets to the hospital by car. The discharge information has been reviewed with the {PATIENT PARENT GUARDIAN:70496}. The {PATIENT PARENT GUARDIAN:46604} verbalized understanding. Discharge medications reviewed with the {Dishcarge meds reviewed XDAU:28953} and appropriate educational materials and side effects teaching were provided.   ___________________________________________________________________________________________________________________________________

## 2018-06-26 NOTE — IP AVS SNAPSHOT
[address] [facility] [address] 
[phone] Patient: Keisha Benjamin MRN: [id number] DTF:[id number]/50[id number] A check elsy indicates which time of day the medication should be taken. My Medications CONTINUE taking these medications Instructions Each Dose to Equal  
 Morning Noon Evening Bedtime TYLENOL EXTRA STRENGTH 500 mg tablet Generic drug:  acetaminophen Your last dose was: Your next dose is: Take 500 mg by mouth every six (6) hours as needed for Pain.   
 500 mg

## 2018-06-26 NOTE — IP AVS SNAPSHOT
[facility] [address] 
[phone] Patient: Jeanine Clark MRN: [id number] ATU:7/27/7164 About your hospitalization You were admitted on:  June 26, 2018 You last received care in the:  [facility] PACU You were discharged on:  June 26, 2018 Why you were hospitalized Your primary diagnosis was:  Not on File Follow-up Information Follow up With Details Comments Contact Info Dayo Carrillo MD   [place] [address] [address] [facility] Internal Medicine [address] 
[phone] Discharge Orders None A check elsy indicates which time of day the medication should be taken. My Medications CONTINUE taking these medications Instructions Each Dose to Equal  
 Morning Noon Evening Bedtime TYLENOL EXTRA STRENGTH 500 mg tablet Generic drug:  acetaminophen Your last dose was: Your next dose is: Take 500 mg by mouth every six (6) hours as needed for Pain. 500 mg Discharge Instructions Discharge Instructions from Dr. Lenny Stoddard ·  
· You may shower, but no hot tubs, swimming pools, or baths until your incision is healed. · No heavy lifting with the affected extremity (nothing greater than 5 pounds), and limit its use for the next 4-5 days. · You may use an ice pack for comfort for the next couple of days, but do not place ice directly on the skin. Rather, use a towel or clothing to serve as a barrier between skin and ice to prevent injury. · If I placed a drain, follow the drain instructions provided, especially as you keep a record of the drain output. · Follow medication instructions carefully. No aspirin, ibuprofen or aleve x 7 days. · You will have bruising and swelling · Watch for signs of infection as listed below. · Redness · Swelling · Drainage from the incision or from your nipple that appears infected · Fever over 101.5 degrees for consecutive readings, or over 99.5 if you are currently undergoing chemotherapy. · Call our office (number is below) for a follow-up appointment. · If you have any problems, our phone number is [phone] DISCHARGE SUMMARY from Nurse PATIENT INSTRUCTIONS: 
 
After general anesthesia or intravenous sedation, for 24 hours or while taking prescription Narcotics: · Limit your activities · Do not drive and operate hazardous machinery · Do not make important personal or business decisions · Do  not drink alcoholic beverages · If you have not urinated within 8 hours after discharge, please contact your surgeon on call. Report the following to your surgeon: 
· Excessive pain, swelling, redness or odor of or around the surgical area · Temperature over 100.5 · Nausea and vomiting lasting longer than 4 hours or if unable to take medications · Any signs of decreased circulation or nerve impairment to extremity: change in color, persistent  numbness, tingling, coldness or increase pain · Any questions What to do at Home: 
Recommended activity: See surgical instructions, If you experience any of the following symptoms as instructed, please follow up with Dr Vel Neff. 
 
*  Please give a list of your current medications to your Primary Care Provider. *  Please update this list whenever your medications are discontinued, doses are 
    changed, or new medications (including over-the-counter products) are added. *  Please carry medication information at all times in case of emergency situations. These are general instructions for a healthy lifestyle: No smoking/ No tobacco products/ Avoid exposure to second hand smoke Surgeon General's Warning:  Quitting smoking now greatly reduces serious risk to your health. Obesity, smoking, and sedentary lifestyle greatly increases your risk for illness A healthy diet, regular physical exercise & weight monitoring are important for maintaining a healthy lifestyle You may be retaining fluid if you have a history of heart failure or if you experience any of the following symptoms:  Weight gain of 3 pounds or more overnight or 5 pounds in a week, increased swelling in our hands or feet or shortness of breath while lying flat in bed. Please call your doctor as soon as you notice any of these symptoms; do not wait until your next office visit. Recognize signs and symptoms of STROKE: 
 
F-face looks uneven A-arms unable to move or move unevenly S-speech slurred or non-existent T-time-call 911 as soon as signs and symptoms begin-DO NOT go Back to bed or wait to see if you get better-TIME IS BRAIN. Warning Signs of HEART ATTACK Call 911 if you have these symptoms: 
? Chest discomfort. Most heart attacks involve discomfort in the center of the chest that lasts more than a few minutes, or that goes away and comes back. It can feel like uncomfortable pressure, squeezing, fullness, or pain. ? Discomfort in other areas of the upper body. Symptoms can include pain or discomfort in one or both arms, the back, neck, jaw, or stomach. ? Shortness of breath with or without chest discomfort. ? Other signs may include breaking out in a cold sweat, nausea, or lightheadedness. Don't wait more than five minutes to call [address]! Fast action can save your life. Calling 911 is almost always the fastest way to get lifesaving treatment. Emergency Medical Services staff can begin treatment when they arrive  up to an hour sooner than if someone gets to the hospital by car. The discharge information has been reviewed with the {PATIENT PARENT GUARDIAN:79093}. The {PATIENT PARENT GUARDIAN:44426} verbalized understanding. Discharge medications reviewed with the {Dishcarge meds reviewed IYHU:25203} and appropriate educational materials and side effects teaching were provided. ___________________________________________________________________________________________________________________________________ Introducing [facility]! Maria Antonia Cruz introduces Pager patient portal. Now you can access parts of your medical record, email your doctor's office, and request medication refills online. 1. In your internet browser, go to [email or web address] 2. Click on the First Time User? Click Here link in the Sign In box. You will see the New Member Sign Up page. 3. Enter your Pager Access Code exactly as it appears below. You will not need to use this code after youve completed the sign-up process. If you do not sign up before the expiration date, you must request a new code. · Pager Access Code: [email or web address] Expires: 6/28/2018 12:32 PM 
 
4. Enter the last four digits of your Social Security Number (xxxx) and Date of Birth (mm/dd/yyyy) as indicated and click Submit. You will be taken to the next sign-up page. 5. Create a Flash Valett ID. This will be your Pager login ID and cannot be changed, so think of one that is secure and easy to remember. 6. Create a Flash Valett password. You can change your password at any time. 7. Enter your Password Reset Question and Answer. This can be used at a later time if you forget your password. 8. Enter your e-mail address. You will receive e-mail notification when new information is available in [facility] 9. Click Sign Up. You can now view and download portions of your medical record. 10. Click the Download Summary menu link to download a portable copy of your medical information. If you have questions, please visit the Frequently Asked Questions section of the Pager website. Remember, Pager is NOT to be used for urgent needs. For medical emergencies, dial 911. Now available from your iPhone and Android! Introducing Jose Eli As a [facility] patient, I wanted to make you aware of our electronic visit tool called Jose Eli. RiverWired/Leadhit allows you to connect within minutes with a medical provider 24 hours a day, seven days a week via a mobile device or tablet or logging into a secure website from your computer. You can access Jose Koromafin from anywhere in the [place]. A virtual visit might be right for you when you have a simple condition and feel like you just dont want to get out of bed, or cant get away from work for an appointment, when your regular [facility] provider is not available (evenings, weekends or holidays), or when youre out of town and need minor care. Electronic visits cost only $49 and if the RiverWired/Leadhit provider determines a prescription is needed to treat your condition, one can be electronically transmitted to a nearby pharmacy*. Please take a moment to enroll today if you have not already done so. The enrollment process is free and takes just a few minutes. To enroll, please download the Cubresa saniya to your tablet or phone, or visit [email or web address] to enroll on your computer. And, as an [address] [place] [address] patient with a [employer] account, the results of your visits will be scanned into your electronic medical record and your primary care provider will be able to view the scanned results. We urge you to continue to see your [facility] provider for your ongoing medical care. And while your primary care provider may not be the one available when you seek a Jose Benavidezmichellefin virtual visit, the peace of mind you get from getting a real diagnosis real time can be priceless. For more information on Jose Benavidezmichellefin, view our Frequently Asked Questions (FAQs) at [email or web address]. Sincerely, 
 
Carmelita Juarez MD 
Chief Medical Officer [facility] Claudia Garcia *:  certain medications cannot be prescribed via Jose Eli Unresulted tests-please follow up with your PCP on these results Procedure/Test Authorizing Provider XR Zaheer Patino MD  
 XR FLUOROSCOPY UNDER 60 MINUTES Debi Cornejo MD  
  
Providers Seen During Your Hospitalization Provider Specialty Primary office phone Debi Cornejo MD Breast Surgery [phone] Your Primary Care Physician (PCP) Primary Care Physician Office Phone Office Fax Vane Guzmán [phone] You are allergic to the following Allergen Reactions Levaquin (Levofloxacin) Anaphylaxis Oxycodone Rash Recent Documentation Height Weight BMI OB Status Smoking Status 1.702 m 79.4 kg 27.41 kg/m2 Having regular periods Never Smoker Emergency Contacts Name Discharge Info Relation Home Work Mobile [facility] DISCHARGE CAREGIVER [3] Spouse [3] [phone] CAREGIVER [3] Brother [24] [phone] Patient Belongings The following personal items are in your possession at time of discharge: 
  Dental Appliances: None      Hearing Aids/Status: Does not own Please provide this summary of care documentation to your next provider. Signatures-by signing, you are acknowledging that this After Visit Summary has been reviewed with you and you have received a copy. Patient Signature:  ____________________________________________________________ Date:  ____________________________________________________________  
  
[facility] Provider Signature:  ____________________________________________________________ Date:  ____________________________________________________________

## 2018-06-26 NOTE — OP NOTES
[facility] REPORT    Brandyn Stevenson  MR#: [id number]  : 1980  ACCOUNT #: [de-identified]   DATE OF SERVICE: 2018    PREOPERATIVE DIAGNOSIS:  Right breast cancer, need for IV access for chemo. POSTOPERATIVE DIAGNOSIS:  Right breast cancer, need for IV access for chemo. PROCEDURE PERFORMED:  Port-A-Cath insertion, left internal jugular vein, ultrasound-guided. SURGEON:  Sharan Simon MD    ASSISTANT:  None    ANESTHESIA:  MAC.    ESTIMATED BLOOD LOSS:  Minimal.    SPECIMENS REMOVED:  None. FINDINGS:  Tip for junction of SVC in the right atrium. COMPLICATIONS:  None. IMPLANTS:  An 8-Ivorian Smart Port by RAYMOND Nevarez. 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 She was evaluated by Medical Oncology and the decision was made to do adjuvant chemo. PROCEDURE IN DETAIL:  The patient was seen in the preop holding area where surgical site was marked by surgeon. Informed consent was obtained. She was taken to the operating room and laid in supine position, where MAC anesthesia was induced. Left chest wall and neck was prepped and draped in the usual fashion. Time out was performed. The patient was placed in Trendelenburg position. Two attempts were made to access the left subclavian vein with no return of blood. Therefore, the ultrasound was used to identify the left internal jugular vein and under direct ultrasound guidance, local anesthetic was injected into the left neck and an 18-gauge introducer needle was used to access this vein. This was done easily. The syringe was removed, wire was threaded through the needle, the needle was removed, and the wire was seen to go towards the SVC and right atrium on fluoroscopy.   Next, 20 mL of local anesthetic was injected into the neck and the left chest wall to create the port pocket. An incision was made just inferior to the left clavicle with a 15 blade. Bovie cautery was used to create a pocket. Tunneling device was used to tunnel through the neck up to the wire. An 11 blade was used to make a bigger incision at the wire. Next, a dilator sheath was threaded over the wire. The inner cannula and wire were removed and the catheter was clamped and threaded through the sheath. The sheath was torn away. Catheter was attached to the tunneling device and this was pulled through to the chest wall pocket site. The tip of the catheter was seen at the [facility] and right atrium on fluoroscopy. The catheter was clamped and cut and placed on the port. The port was secured in the port pocket with 3-0 Prolene on either side. This aspirated and flushed well with injectable saline and final heparin flush. Incisions were closed with interrupted 3-0 Vicryl and 4-0 subcuticular Monocryl. Dermabond was placed on the incisions. All sponge, needle, instrument counts were correct. The patient went to recovery in stable condition.       MD JAIME Grady / Edy Loya  D: 06/26/2018 11:00     T: 06/26/2018 13:30  JOB #: [id number]

## 2018-06-26 NOTE — H&P (VIEW-ONLY)
HISTORY OF PRESENT ILLNESS  Sang Talbert is a 44 y.o. female. HPI  ESTABLISHED patient here for post op follow up, s/p RIGHT mastectomy, SNBx, reconstruction with tissue Expander. Patient is doing well. Continues to have pain and taking her pain medication. Followed up with Dr. Sagar Hwang yesterday and drains were removed. Patient feels as if she is healing well. 43 yo female with multifocal dcis er/pr negative, high grade with comedonecrosis. 5/22/18 - RIGHT mastectomy, SNbx, with reconstruction, TE - pathology showed IDC 8 mm and DCIS, ER 0%, AK 0%, HER2 3+  Separate nodule 8 mm resected separately, not true + margin. Review of Systems   All other systems reviewed and are negative. Physical Exam   Pulmonary/Chest:       Right breast surgically absent. Expander intact. Drain removed already. Nursing note and vitals reviewed. ASSESSMENT and PLAN    ICD-10-CM ICD-9-CM    1. [facility] C50.811 174.8      43 yo multicentric dcis and idc, grade 3, stage 1 (8 mm) er/pr negative, her 2 orion 3+. S/p mastectomy, sln biopsy. - refer to med onc for chemo, her 2 blockade   - will need port. - continue to follow-up with Dr. Sagar Hwang.    - no xrt

## 2018-06-26 NOTE — BRIEF OP NOTE
BRIEF OPERATIVE NOTE    Date of Procedure: 6/26/2018   Preoperative Diagnosis: BREAST CANCER RIGHT BREAST; NEED FOR IV ACCESS  Postoperative Diagnosis: BREAST CANCER    Procedure(s):  PORTACATH INSERTION  Surgeon(s) and Role:     * 815 Aimee Weaver MD - Primary         Surgical Assistant: none    Surgical Staff:  Circ-1: Tash Cee RN  Radiology Technician: Jenni Olivares RT, R  Registered Nurse Assistant: Andres Early RN  Scrub RN-1: Lamar Parnell RN  Event Time In   Incision Start 1000   Incision Close 1028     Anesthesia: MAC   Estimated Blood Loss: minimal  Specimens: * No specimens in log *   Findings: tip of port junction svc and right atrium   Complications: none  Implants:   Implant Name Type Inv.  Item Serial No.  Lot No. LRB No. Used Action   PORT VASC INFUS SET 8FR TI -- SMART PORT CT - SN/A   PORT VASC INFUS SET 8FR TI -- SMART PORT CT N/A ANGIODYNAMICS [id number] Left 1 Implanted       Dictated [id number]

## 2018-06-26 NOTE — ROUTINE PROCESS
Patient: Pepper Alcantara MRN: [id number]  SSN: [id number]   YOB: 1980  Age: 44 y.o. Sex: female     Patient is status post Procedure(s):  PORTACATH INSERTION.     Surgeon(s) and Role:     * Giles Moran MD - Primary    Local/Dose/Irrigation:                                           Dressing/Packing:  Wound Chest Left-DRESSING TYPE:  (EXOFIN) (06/26/18 0900)  Splint/Cast:  ]    Other:

## 2018-06-26 NOTE — INTERVAL H&P NOTE
H&P Update:  Radha Henriquez was seen and examined. History and physical has been reviewed. The patient has been examined.  There have been no significant clinical changes since the completion of the originally dated History and Physical.    Signed By: Luis F Weaver MD     June 26, 2018 7:17 AM

## 2018-06-26 NOTE — ANESTHESIA PREPROCEDURE EVALUATION
Anesthetic History   No history of anesthetic complications            Review of Systems / Medical History  Patient summary reviewed, nursing notes reviewed and pertinent labs reviewed    Pulmonary  Within defined limits                 Neuro/Psych   Within defined limits           Cardiovascular  Within defined limits                     GI/Hepatic/Renal  Within defined limits              Endo/Other  Within defined limits           Other Findings   Comments: Breast CA           Physical Exam    Airway  Mallampati: II  TM Distance: > 6 cm  Neck ROM: normal range of motion   Mouth opening: Normal     Cardiovascular  Regular rate and rhythm,  S1 and S2 normal,  no murmur, click, rub, or gallop             Dental  No notable dental hx       Pulmonary  Breath sounds clear to auscultation               Abdominal  GI exam deferred       Other Findings            Anesthetic Plan    ASA: 2  Anesthesia type: MAC          Induction: Intravenous  Anesthetic plan and risks discussed with: Patient

## 2018-06-27 ENCOUNTER — TELEPHONE (OUTPATIENT)
Dept: SURGERY | Age: 38
End: 2018-06-27

## 2018-06-27 NOTE — TELEPHONE ENCOUNTER
Called patient to see how she is doing after surgery. She is doing well. Starts chemo on Monday. I told her to call if she needs anything. She is very appreciative.

## 2018-10-31 ENCOUNTER — HOSPITAL ENCOUNTER (OUTPATIENT)
Dept: PREADMISSION TESTING | Age: 38
Discharge: HOME | End: 2018-10-31

## 2018-10-31 VITALS
RESPIRATION RATE: 18 BRPM | HEIGHT: 66 IN | BODY MASS INDEX: 28.85 KG/M2 | SYSTOLIC BLOOD PRESSURE: 166 MMHG | DIASTOLIC BLOOD PRESSURE: 99 MMHG | TEMPERATURE: 98 F | WEIGHT: 179.5 LBS | HEART RATE: 71 BPM

## 2018-10-31 RX ORDER — HYDROCODONE BITARTRATE AND ACETAMINOPHEN 5; 325 MG/1; MG/1
TABLET ORAL
COMMUNITY
End: 2019-07-15

## 2018-10-31 RX ORDER — IBUPROFEN 200 MG
200 TABLET ORAL
COMMUNITY
End: 2018-11-06

## 2018-10-31 RX ORDER — AMLODIPINE BESYLATE 5 MG/1
5 TABLET ORAL DAILY
COMMUNITY
End: 2020-09-08

## 2018-10-31 NOTE — PERIOP NOTES
Preoperative instructions reviewed with patient. Patient given SSI infection FAQS sheet. Given skin prep chlorhexidine wipes; given written and verbal instructions on use. Patient was given the opportunity to ask questions on the information provided.

## 2018-11-05 ENCOUNTER — ANESTHESIA EVENT (OUTPATIENT)
Dept: MEDSURG UNIT | Age: 38
End: 2018-11-05
Payer: COMMERCIAL

## 2018-11-05 NOTE — PERIOP NOTES
DR. Ann Vizcaino OFFICE CALLED AND SPOKE WITH SUSAN MCCALL,  REGARDING NO LABS OR EKG BEING DONE IN PREADMISSION TESTING ON 10/31/18. CAL NOTIFIED DR. Ann Vizcaino AND WAS INSTRUCTED TO PROCEED WITH SURGERY AS SCHEDULED AND WILL RE-EVALUATE NEED FOR LABS OR EKG DAY OF SURGERY PER DR. Ann Vizcaino.

## 2018-11-06 ENCOUNTER — HOSPITAL ENCOUNTER (OUTPATIENT)
Age: 38
Setting detail: OUTPATIENT SURGERY
Discharge: HOME | End: 2018-11-06
Attending: PLASTIC SURGERY | Admitting: PLASTIC SURGERY
Payer: COMMERCIAL

## 2018-11-06 ENCOUNTER — ANESTHESIA (OUTPATIENT)
Dept: MEDSURG UNIT | Age: 38
End: 2018-11-06
Payer: COMMERCIAL

## 2018-11-06 VITALS
RESPIRATION RATE: 24 BRPM | HEART RATE: 80 BPM | OXYGEN SATURATION: 92 % | WEIGHT: 179 LBS | SYSTOLIC BLOOD PRESSURE: 145 MMHG | HEIGHT: 66 IN | DIASTOLIC BLOOD PRESSURE: 74 MMHG | BODY MASS INDEX: 28.77 KG/M2 | TEMPERATURE: 97.6 F

## 2018-11-06 DIAGNOSIS — D05.11 DUCTAL CARCINOMA IN SITU (DCIS) OF RIGHT BREAST: Primary | ICD-10-CM

## 2018-11-06 LAB — HCG UR QL: NEGATIVE

## 2018-11-06 PROCEDURE — 74011250636 HC RX REV CODE- 250/636: Performed by: PLASTIC SURGERY

## 2018-11-06 PROCEDURE — 74011250636 HC RX REV CODE- 250/636

## 2018-11-06 PROCEDURE — 77030002966 HC SUT PDS J&J -A: Performed by: PLASTIC SURGERY

## 2018-11-06 PROCEDURE — 77030002933 HC SUT MCRYL J&J -A: Performed by: PLASTIC SURGERY

## 2018-11-06 PROCEDURE — C1789 PROSTHESIS, BREAST, IMP: HCPCS | Performed by: PLASTIC SURGERY

## 2018-11-06 PROCEDURE — 76210000037 HC AMBSU PH I REC 2 TO 2.5 HR: Performed by: PLASTIC SURGERY

## 2018-11-06 PROCEDURE — 77030019702 HC WRP THER MENM -C: Performed by: PLASTIC SURGERY

## 2018-11-06 PROCEDURE — 77030018836 HC SOL IRR NACL ICUM -A: Performed by: PLASTIC SURGERY

## 2018-11-06 PROCEDURE — 88305 TISSUE EXAM BY PATHOLOGIST: CPT

## 2018-11-06 PROCEDURE — 74011250636 HC RX REV CODE- 250/636: Performed by: ANESTHESIOLOGY

## 2018-11-06 PROCEDURE — 76030000005 HC AMB SURG OR TIME 2.5 TO 3: Performed by: PLASTIC SURGERY

## 2018-11-06 PROCEDURE — 77030026227 HC FUNL KELLR 2 PCH ALGN -C: Performed by: PLASTIC SURGERY

## 2018-11-06 PROCEDURE — 77030026438 HC STYL ET INTUB CARD -A: Performed by: ANESTHESIOLOGY

## 2018-11-06 PROCEDURE — 74011000250 HC RX REV CODE- 250

## 2018-11-06 PROCEDURE — 77030011640 HC PAD GRND REM COVD -A: Performed by: PLASTIC SURGERY

## 2018-11-06 PROCEDURE — 77030013567 HC DRN WND RESERV BARD -A: Performed by: PLASTIC SURGERY

## 2018-11-06 PROCEDURE — 77030008684 HC TU ET CUF COVD -B: Performed by: ANESTHESIOLOGY

## 2018-11-06 PROCEDURE — 77030037277 HC PROCTR BRST IMPL DISP ADEP -B: Performed by: PLASTIC SURGERY

## 2018-11-06 PROCEDURE — 81025 URINE PREGNANCY TEST: CPT

## 2018-11-06 PROCEDURE — 77030020782 HC GWN BAIR PAWS FLX 3M -B

## 2018-11-06 PROCEDURE — 77030012407 HC DRN WND BARD -B: Performed by: PLASTIC SURGERY

## 2018-11-06 PROCEDURE — 74011000272 HC RX REV CODE- 272: Performed by: PLASTIC SURGERY

## 2018-11-06 PROCEDURE — 76060000065 HC AMB SURG ANES 2.5 TO 3 HR: Performed by: PLASTIC SURGERY

## 2018-11-06 PROCEDURE — 77030032490 HC SLV COMPR SCD KNE COVD -B: Performed by: PLASTIC SURGERY

## 2018-11-06 PROCEDURE — 77030011825 HC SUPP SURG PSTOP S2SG -B: Performed by: PLASTIC SURGERY

## 2018-11-06 PROCEDURE — 77030008467 HC STPLR SKN COVD -B: Performed by: PLASTIC SURGERY

## 2018-11-06 PROCEDURE — 74011000250 HC RX REV CODE- 250: Performed by: PLASTIC SURGERY

## 2018-11-06 PROCEDURE — 77030011266 HC ELECTRD BLD INSL COVD -A: Performed by: PLASTIC SURGERY

## 2018-11-06 PROCEDURE — L8600 IMPLANT BREAST SILICONE/EQ: HCPCS | Performed by: PLASTIC SURGERY

## 2018-11-06 RX ORDER — MIDAZOLAM HYDROCHLORIDE 1 MG/ML
1 INJECTION, SOLUTION INTRAMUSCULAR; INTRAVENOUS AS NEEDED
Status: DISCONTINUED | OUTPATIENT
Start: 2018-11-06 | End: 2018-11-06 | Stop reason: HOSPADM

## 2018-11-06 RX ORDER — LIDOCAINE HYDROCHLORIDE 10 MG/ML
0.1 INJECTION, SOLUTION EPIDURAL; INFILTRATION; INTRACAUDAL; PERINEURAL AS NEEDED
Status: DISCONTINUED | OUTPATIENT
Start: 2018-11-06 | End: 2018-11-06 | Stop reason: HOSPADM

## 2018-11-06 RX ORDER — SODIUM CHLORIDE 9 MG/ML
1000 INJECTION, SOLUTION INTRAVENOUS CONTINUOUS
Status: DISCONTINUED | OUTPATIENT
Start: 2018-11-06 | End: 2018-11-06 | Stop reason: HOSPADM

## 2018-11-06 RX ORDER — GLYCOPYRROLATE 0.2 MG/ML
INJECTION INTRAMUSCULAR; INTRAVENOUS AS NEEDED
Status: DISCONTINUED | OUTPATIENT
Start: 2018-11-06 | End: 2018-11-06 | Stop reason: HOSPADM

## 2018-11-06 RX ORDER — DEXAMETHASONE SODIUM PHOSPHATE 4 MG/ML
INJECTION, SOLUTION INTRA-ARTICULAR; INTRALESIONAL; INTRAMUSCULAR; INTRAVENOUS; SOFT TISSUE AS NEEDED
Status: DISCONTINUED | OUTPATIENT
Start: 2018-11-06 | End: 2018-11-06 | Stop reason: HOSPADM

## 2018-11-06 RX ORDER — SODIUM CHLORIDE 0.9 % (FLUSH) 0.9 %
5-10 SYRINGE (ML) INJECTION EVERY 8 HOURS
Status: DISCONTINUED | OUTPATIENT
Start: 2018-11-06 | End: 2018-11-06 | Stop reason: HOSPADM

## 2018-11-06 RX ORDER — CEPHALEXIN 500 MG/1
500 CAPSULE ORAL 3 TIMES DAILY
Qty: 21 CAP | Refills: 0 | Status: SHIPPED | OUTPATIENT
Start: 2018-11-06 | End: 2018-11-13

## 2018-11-06 RX ORDER — SODIUM CHLORIDE 0.9 % (FLUSH) 0.9 %
5-10 SYRINGE (ML) INJECTION AS NEEDED
Status: DISCONTINUED | OUTPATIENT
Start: 2018-11-06 | End: 2018-11-06 | Stop reason: HOSPADM

## 2018-11-06 RX ORDER — SODIUM CHLORIDE, SODIUM LACTATE, POTASSIUM CHLORIDE, CALCIUM CHLORIDE 600; 310; 30; 20 MG/100ML; MG/100ML; MG/100ML; MG/100ML
125 INJECTION, SOLUTION INTRAVENOUS CONTINUOUS
Status: DISCONTINUED | OUTPATIENT
Start: 2018-11-06 | End: 2018-11-06 | Stop reason: HOSPADM

## 2018-11-06 RX ORDER — ACETAMINOPHEN 10 MG/ML
INJECTION, SOLUTION INTRAVENOUS AS NEEDED
Status: DISCONTINUED | OUTPATIENT
Start: 2018-11-06 | End: 2018-11-06 | Stop reason: HOSPADM

## 2018-11-06 RX ORDER — FENTANYL CITRATE 50 UG/ML
50 INJECTION, SOLUTION INTRAMUSCULAR; INTRAVENOUS AS NEEDED
Status: DISCONTINUED | OUTPATIENT
Start: 2018-11-06 | End: 2018-11-06 | Stop reason: HOSPADM

## 2018-11-06 RX ORDER — SUCCINYLCHOLINE CHLORIDE 20 MG/ML
INJECTION INTRAMUSCULAR; INTRAVENOUS AS NEEDED
Status: DISCONTINUED | OUTPATIENT
Start: 2018-11-06 | End: 2018-11-06 | Stop reason: HOSPADM

## 2018-11-06 RX ORDER — ONDANSETRON 2 MG/ML
INJECTION INTRAMUSCULAR; INTRAVENOUS AS NEEDED
Status: DISCONTINUED | OUTPATIENT
Start: 2018-11-06 | End: 2018-11-06 | Stop reason: HOSPADM

## 2018-11-06 RX ORDER — HYDROMORPHONE HYDROCHLORIDE 2 MG/ML
INJECTION, SOLUTION INTRAMUSCULAR; INTRAVENOUS; SUBCUTANEOUS AS NEEDED
Status: DISCONTINUED | OUTPATIENT
Start: 2018-11-06 | End: 2018-11-06 | Stop reason: HOSPADM

## 2018-11-06 RX ORDER — PROPOFOL 10 MG/ML
INJECTION, EMULSION INTRAVENOUS AS NEEDED
Status: DISCONTINUED | OUTPATIENT
Start: 2018-11-06 | End: 2018-11-06 | Stop reason: HOSPADM

## 2018-11-06 RX ORDER — LIDOCAINE HYDROCHLORIDE 20 MG/ML
INJECTION, SOLUTION EPIDURAL; INFILTRATION; INTRACAUDAL; PERINEURAL AS NEEDED
Status: DISCONTINUED | OUTPATIENT
Start: 2018-11-06 | End: 2018-11-06 | Stop reason: HOSPADM

## 2018-11-06 RX ORDER — BACITRACIN 500 [USP'U]/G
OINTMENT TOPICAL AS NEEDED
Status: DISCONTINUED | OUTPATIENT
Start: 2018-11-06 | End: 2018-11-06 | Stop reason: HOSPADM

## 2018-11-06 RX ORDER — HYDROCODONE BITARTRATE AND ACETAMINOPHEN 5; 325 MG/1; MG/1
1-2 TABLET ORAL
Qty: 40 TAB | Refills: 0 | Status: SHIPPED | OUTPATIENT
Start: 2018-11-06 | End: 2019-07-15

## 2018-11-06 RX ORDER — FENTANYL CITRATE 50 UG/ML
25 INJECTION, SOLUTION INTRAMUSCULAR; INTRAVENOUS
Status: DISCONTINUED | OUTPATIENT
Start: 2018-11-06 | End: 2018-11-06 | Stop reason: HOSPADM

## 2018-11-06 RX ORDER — MORPHINE SULFATE 10 MG/ML
2 INJECTION, SOLUTION INTRAMUSCULAR; INTRAVENOUS
Status: DISCONTINUED | OUTPATIENT
Start: 2018-11-06 | End: 2018-11-06 | Stop reason: HOSPADM

## 2018-11-06 RX ORDER — MIDAZOLAM HYDROCHLORIDE 1 MG/ML
0.5 INJECTION, SOLUTION INTRAMUSCULAR; INTRAVENOUS
Status: DISCONTINUED | OUTPATIENT
Start: 2018-11-06 | End: 2018-11-06 | Stop reason: HOSPADM

## 2018-11-06 RX ORDER — DEXMEDETOMIDINE HYDROCHLORIDE 4 UG/ML
INJECTION, SOLUTION INTRAVENOUS AS NEEDED
Status: DISCONTINUED | OUTPATIENT
Start: 2018-11-06 | End: 2018-11-06 | Stop reason: HOSPADM

## 2018-11-06 RX ORDER — SODIUM CHLORIDE 9 MG/ML
50 INJECTION, SOLUTION INTRAVENOUS CONTINUOUS
Status: DISCONTINUED | OUTPATIENT
Start: 2018-11-06 | End: 2018-11-06 | Stop reason: HOSPADM

## 2018-11-06 RX ORDER — FENTANYL CITRATE 50 UG/ML
INJECTION, SOLUTION INTRAMUSCULAR; INTRAVENOUS AS NEEDED
Status: DISCONTINUED | OUTPATIENT
Start: 2018-11-06 | End: 2018-11-06 | Stop reason: HOSPADM

## 2018-11-06 RX ORDER — NEOSTIGMINE METHYLSULFATE 1 MG/ML
INJECTION INTRAVENOUS AS NEEDED
Status: DISCONTINUED | OUTPATIENT
Start: 2018-11-06 | End: 2018-11-06 | Stop reason: HOSPADM

## 2018-11-06 RX ORDER — CEFAZOLIN SODIUM 1 G/3ML
INJECTION, POWDER, FOR SOLUTION INTRAMUSCULAR; INTRAVENOUS AS NEEDED
Status: DISCONTINUED | OUTPATIENT
Start: 2018-11-06 | End: 2018-11-06 | Stop reason: HOSPADM

## 2018-11-06 RX ORDER — ROCURONIUM BROMIDE 10 MG/ML
INJECTION, SOLUTION INTRAVENOUS AS NEEDED
Status: DISCONTINUED | OUTPATIENT
Start: 2018-11-06 | End: 2018-11-06 | Stop reason: HOSPADM

## 2018-11-06 RX ORDER — ONDANSETRON 2 MG/ML
4 INJECTION INTRAMUSCULAR; INTRAVENOUS AS NEEDED
Status: DISCONTINUED | OUTPATIENT
Start: 2018-11-06 | End: 2018-11-06 | Stop reason: HOSPADM

## 2018-11-06 RX ORDER — MIDAZOLAM HYDROCHLORIDE 1 MG/ML
INJECTION, SOLUTION INTRAMUSCULAR; INTRAVENOUS AS NEEDED
Status: DISCONTINUED | OUTPATIENT
Start: 2018-11-06 | End: 2018-11-06 | Stop reason: HOSPADM

## 2018-11-06 RX ADMIN — ONDANSETRON 4 MG: 2 INJECTION INTRAMUSCULAR; INTRAVENOUS at 14:45

## 2018-11-06 RX ADMIN — ROCURONIUM BROMIDE 25 MG: 10 INJECTION, SOLUTION INTRAVENOUS at 10:34

## 2018-11-06 RX ADMIN — DEXMEDETOMIDINE HYDROCHLORIDE 4 MCG: 4 INJECTION, SOLUTION INTRAVENOUS at 13:05

## 2018-11-06 RX ADMIN — ACETAMINOPHEN 1000 MG: 10 INJECTION, SOLUTION INTRAVENOUS at 10:34

## 2018-11-06 RX ADMIN — HYDROMORPHONE HYDROCHLORIDE 0.5 MG: 2 INJECTION, SOLUTION INTRAMUSCULAR; INTRAVENOUS; SUBCUTANEOUS at 11:15

## 2018-11-06 RX ADMIN — FENTANYL CITRATE 25 MCG: 50 INJECTION, SOLUTION INTRAMUSCULAR; INTRAVENOUS at 13:05

## 2018-11-06 RX ADMIN — FENTANYL CITRATE 50 MCG: 50 INJECTION, SOLUTION INTRAMUSCULAR; INTRAVENOUS at 11:03

## 2018-11-06 RX ADMIN — MIDAZOLAM HYDROCHLORIDE 1 MG: 1 INJECTION, SOLUTION INTRAMUSCULAR; INTRAVENOUS at 10:18

## 2018-11-06 RX ADMIN — LIDOCAINE HYDROCHLORIDE 80 MG: 20 INJECTION, SOLUTION EPIDURAL; INFILTRATION; INTRACAUDAL; PERINEURAL at 10:23

## 2018-11-06 RX ADMIN — DEXAMETHASONE SODIUM PHOSPHATE 10 MG: 4 INJECTION, SOLUTION INTRA-ARTICULAR; INTRALESIONAL; INTRAMUSCULAR; INTRAVENOUS; SOFT TISSUE at 10:38

## 2018-11-06 RX ADMIN — DEXMEDETOMIDINE HYDROCHLORIDE 4 MCG: 4 INJECTION, SOLUTION INTRAVENOUS at 11:15

## 2018-11-06 RX ADMIN — ONDANSETRON 4 MG: 2 INJECTION INTRAMUSCULAR; INTRAVENOUS at 12:43

## 2018-11-06 RX ADMIN — MIDAZOLAM HYDROCHLORIDE 3 MG: 1 INJECTION, SOLUTION INTRAMUSCULAR; INTRAVENOUS at 10:14

## 2018-11-06 RX ADMIN — FENTANYL CITRATE 25 MCG: 50 INJECTION, SOLUTION INTRAMUSCULAR; INTRAVENOUS at 14:10

## 2018-11-06 RX ADMIN — ROCURONIUM BROMIDE 5 MG: 10 INJECTION, SOLUTION INTRAVENOUS at 10:23

## 2018-11-06 RX ADMIN — NEOSTIGMINE METHYLSULFATE 3 MG: 1 INJECTION INTRAVENOUS at 12:47

## 2018-11-06 RX ADMIN — SODIUM CHLORIDE, POTASSIUM CHLORIDE, SODIUM LACTATE AND CALCIUM CHLORIDE: 600; 310; 30; 20 INJECTION, SOLUTION INTRAVENOUS at 12:05

## 2018-11-06 RX ADMIN — DEXMEDETOMIDINE HYDROCHLORIDE 4 MCG: 4 INJECTION, SOLUTION INTRAVENOUS at 11:06

## 2018-11-06 RX ADMIN — PROPOFOL 170 MG: 10 INJECTION, EMULSION INTRAVENOUS at 10:23

## 2018-11-06 RX ADMIN — FENTANYL CITRATE 25 MCG: 50 INJECTION, SOLUTION INTRAMUSCULAR; INTRAVENOUS at 11:15

## 2018-11-06 RX ADMIN — FENTANYL CITRATE 50 MCG: 50 INJECTION, SOLUTION INTRAMUSCULAR; INTRAVENOUS at 10:34

## 2018-11-06 RX ADMIN — SODIUM CHLORIDE, POTASSIUM CHLORIDE, SODIUM LACTATE AND CALCIUM CHLORIDE: 600; 310; 30; 20 INJECTION, SOLUTION INTRAVENOUS at 10:12

## 2018-11-06 RX ADMIN — HYDROMORPHONE HYDROCHLORIDE 0.5 MG: 2 INJECTION, SOLUTION INTRAMUSCULAR; INTRAVENOUS; SUBCUTANEOUS at 11:03

## 2018-11-06 RX ADMIN — FENTANYL CITRATE 50 MCG: 50 INJECTION, SOLUTION INTRAMUSCULAR; INTRAVENOUS at 10:23

## 2018-11-06 RX ADMIN — MIDAZOLAM HYDROCHLORIDE 1 MG: 1 INJECTION, SOLUTION INTRAMUSCULAR; INTRAVENOUS at 10:16

## 2018-11-06 RX ADMIN — SODIUM CHLORIDE, POTASSIUM CHLORIDE, SODIUM LACTATE AND CALCIUM CHLORIDE: 600; 310; 30; 20 INJECTION, SOLUTION INTRAVENOUS at 12:04

## 2018-11-06 RX ADMIN — GLYCOPYRROLATE 0.6 MG: 0.2 INJECTION INTRAMUSCULAR; INTRAVENOUS at 12:46

## 2018-11-06 RX ADMIN — FENTANYL CITRATE 25 MCG: 50 INJECTION, SOLUTION INTRAMUSCULAR; INTRAVENOUS at 13:40

## 2018-11-06 RX ADMIN — CEFAZOLIN SODIUM 2 G: 1 INJECTION, POWDER, FOR SOLUTION INTRAMUSCULAR; INTRAVENOUS at 10:28

## 2018-11-06 RX ADMIN — DEXMEDETOMIDINE HYDROCHLORIDE 4 MCG: 4 INJECTION, SOLUTION INTRAVENOUS at 12:48

## 2018-11-06 RX ADMIN — SUCCINYLCHOLINE CHLORIDE 120 MG: 20 INJECTION INTRAMUSCULAR; INTRAVENOUS at 10:24

## 2018-11-06 NOTE — H&P
Pre-op History and Physical 
 
CC: BREAST CANCER   
HPI: 43y.o. year old female with BREAST CANCER  for Procedure(s): REMOVAL RIGHT BREAST TISSUE EXPANDER, REVISION RIGHT  RECONSTRUCTED BREAST, RIGHT CAPSULOTOMY PLACEMENT RIGHT BREAST GEL IMPLANT, LEFT BREAST REDUCTION 
BREAST RECONSTRUCTION 
BREAST REDUCTION. Past medical history:  
Past Medical History:  
Diagnosis Date  Arrhythmia HEART MURMUR AGE 6  
 Calculus of kidney 2010  Cancer ([address] 2018 R BREAST CA  
 Chronic kidney disease 2008 STONES   
 Hypertension  NVD (normal vaginal delivery) 08/18/2017  Overweight(278.02) 8/28/2013 Past surgical history:  
Past Surgical History:  
Procedure Laterality Date  HX BREAST BIOPSY Right 2018 POSITIVE FOR CANCER  
 HX MASTECTOMY Right 05/22/2018  HX VASCULAR ACCESS  06/26/2018 Port-A-Cath insertion, left internal jugular vein Family history:  
Family History Problem Relation Age of Onset  Hypertension Mother  High Cholesterol Father  Diabetes Father  Cancer Father   
     lung  No Known Problems Brother  No Known Problems Son  No Known Problems Daughter  Anesth Problems Neg Hx Social history:  
Social History Socioeconomic History  Marital status:  Spouse name: Not on file  Number of children: Not on file  Years of education: Not on file  Highest education level: Not on file Social Needs  Financial resource strain: Not on file  Food insecurity - worry: Not on file  Food insecurity - inability: Not on file  Transportation needs - medical: Not on file  Transportation needs - non-medical: Not on file Occupational History  Not on file Tobacco Use  Smoking status: Never Smoker  Smokeless tobacco: Never Used Substance and Sexual Activity  Alcohol use: Yes Comment: RARELY  Drug use: No  
 Sexual activity: Yes  
  Partners: Male Birth control/protection: None, Condom Other Topics Concern  Not on file Social History Narrative  Not on file Home Medications:  
Prior to Admission medications Medication Sig Start Date End Date Taking? Authorizing Provider  
ibuprofen (MOTRIN IB) 200 mg tablet Take 200 mg by mouth every six (6) hours as needed for Pain. Provider, Historical  
HYDROcodone-acetaminophen (NORCO) 5-325 mg per tablet Take  by mouth. Provider, Historical  
amLODIPine (NORVASC) 5 mg tablet Take 5 mg by mouth daily. Provider, Historical  
acetaminophen (TYLENOL EXTRA STRENGTH) 500 mg tablet Take 500 mg by mouth every six (6) hours as needed for Pain. Provider, Historical  
  
Allergies: Allergies Allergen Reactions  Levaquin [Levofloxacin] Anaphylaxis  Oxycodone Rash Review of systems:  Denies headache, fever, chills, weight change, congestion, sore throat, chest pain, shortness of breath, nausea, vomiting, diarrhea, constipation, abdominal pain, generalized weakness, muscle or joint pain, and rash. Physical Exam: 
Vitals: There were no vitals taken for this visit. General: awake and alert, NAD Neck: supple Cor: RRR Lungs: clear Abdomen: soft, non-tender, non-distended Extremities: no edema Skin: no rash Impression: BREAST CANCER Plan:  Procedure(s): REMOVAL RIGHT BREAST TISSUE EXPANDER, REVISION RIGHT  RECONSTRUCTED BREAST, RIGHT CAPSULOTOMY PLACEMENT RIGHT BREAST GEL IMPLANT, LEFT BREAST REDUCTION 
BREAST RECONSTRUCTION 
BREAST REDUCTION

## 2018-11-06 NOTE — ROUTINE PROCESS
Patient: Oliva Cameron MRN: [id number]  SSN: [id number] YOB: 1980  Age: 43 y.o. Sex: female Patient is status post Procedure(s): REMOVAL RIGHT BREAST TISSUE EXPANDER, REVISION RIGHT  RECONSTRUCTED BREAST, RIGHT CAPSULOTOMY AND PLACEMENT RIGHT BREAST GEL IMPLANT, LEFT BREAST REDUCTION FOR SYMMETRY 
BREAST RECONSTRUCTION 
BREAST REDUCTION. Surgeon(s) and Role: 
   * Anya Montemayor MD - Primary Local/Dose/Irrigation:  SEE MAR Peripheral IV 11/06/18 Left Antecubital (Active) Site Assessment Clean, dry, & intact 11/6/2018 10:12 AM  
Phlebitis Assessment 0 11/6/2018 10:12 AM  
Infiltration Assessment 0 11/6/2018 10:12 AM  
Dressing Status Clean, dry, & intact 11/6/2018 10:12 AM  
Dressing Type Transparent 11/6/2018 10:12 AM  
Hub Color/Line Status Pink 11/6/2018 10:12 AM  
Alcohol Cap Used Yes 11/6/2018 10:12 AM  
                 
 
 
 
Dressing/Packing:  Wound Breast Left-DRESSING TYPE: (XEROFORM, 4X4, ABD, TAPE, BRA) (11/06/18 1100) Wound Breast Right-DRESSING TYPE: (XEROFROM, 4X4, ABD, TAPE, BRA) (11/06/18 1100) Splint/Cast:  ] Other:

## 2018-11-06 NOTE — ANESTHESIA PREPROCEDURE EVALUATION
Anesthetic History No history of anesthetic complications Review of Systems / Medical History Patient summary reviewed, nursing notes reviewed and pertinent labs reviewed Pulmonary Within defined limits Neuro/Psych Within defined limits Cardiovascular Within defined limits Hypertension Dysrhythmias GI/Hepatic/Renal 
Within defined limits Endo/Other Within defined limits Cancer Other Findings Physical Exam 
 
Airway Mallampati: II 
TM Distance: > 6 cm Neck ROM: normal range of motion Mouth opening: Normal 
 
 Cardiovascular Regular rate and rhythm,  S1 and S2 normal,  no murmur, click, rub, or gallop Dental 
No notable dental hx Pulmonary Breath sounds clear to auscultation Abdominal 
GI exam deferred Other Findings Anesthetic Plan ASA: 2 Anesthesia type: general 
 
 
 
 
Induction: Intravenous Anesthetic plan and risks discussed with: Patient

## 2018-11-06 NOTE — ANESTHESIA POSTPROCEDURE EVALUATION
Procedure(s): REMOVAL RIGHT BREAST TISSUE EXPANDER, REVISION RIGHT  RECONSTRUCTED BREAST, RIGHT CAPSULOTOMY AND PLACEMENT RIGHT BREAST GEL IMPLANT, LEFT BREAST REDUCTION FOR SYMMETRY 
BREAST RECONSTRUCTION 
BREAST REDUCTION. Anesthesia Post Evaluation Multimodal analgesia: multimodal analgesia used between 6 hours prior to anesthesia start to PACU discharge Patient location during evaluation: PACU Patient participation: complete - patient participated Level of consciousness: awake Pain score: 2 Pain management: adequate Airway patency: patent Anesthetic complications: no 
Cardiovascular status: acceptable Respiratory status: acceptable Hydration status: acceptable Comments: I have evaluated the patient and meets criteria for discharge from PACU. Margareth James MD 
Post anesthesia nausea and vomiting:  controlled Visit Vitals /74 Pulse 80 Temp 36.4 °C (97.6 °F) Resp 24 Ht 5' 6\" (1.676 m) Wt 81.2 kg (179 lb) SpO2 92% BMI 28.89 kg/m²

## 2018-11-06 NOTE — DISCHARGE INSTRUCTIONS
Dougie Mckay MD, [facility]  [phone]    breast reconstruction/ breast implant post-operative instructions      1. Surgical Bra:  You will be in a surgical bra following the procedure. If bandages were placed during the operation, you may remove them after 48 hours. The fitted post-operative bra (or other support bra) should be worn at all times except when showering for the first two weeks. There may be a small amount of oozing from the incisions for the first day or two. This is normal.  The bra should be washed when it gets soiled. 2. Support bra:  After the first two days, you may purchase a sports-type bra that fastens in the front and has NO UNDERWIRE, from a store such as [employer] or [employer]. This should be worn day and night, except when showering, for one month. 3. Activity:  Take it easy for the first several days. No cleaning, housework, or strenuous activity. Do not lift anything over 10 pounds, including children. You may resume non-vigorous activities at two weeks, then normal activities at four weeks. You should avoid bench pressing, push-ups, and pec deck exercises. 4. Bathing: You may shower one day after the surgery. No tub baths or swimming for one week. Remove any gauze or bandages before showering. 5. Medication:  You will receive prescriptions for an antibiotic, and pain medication. Take the antibiotic until it is finished, and the pain medication as needed according to the directions. Avoid aspirin for two weeks after surgery. 6. Swelling: If you experience excessive swelling on one side, out of proportion to the other side, especially during the first 24-48 hours after surgery, this could be due to bleeding under the skin (hematoma). Please call the doctor immediately should this occur.     7. Follow-up appointment: please call the office at [phone] during regular business hours to schedule an appointment in 2 weeks.        DISCHARGE SUMMARY from Nurse    PATIENT INSTRUCTIONS:    You received IV Tylenol at 1030am, please do not take any additional Tylenol or Tylenol containing medications until 4:30PM.    After general anesthesia or intravenous sedation, for 24 hours or while taking prescription Narcotics:  · Limit your activities  · Do not drive and operate hazardous machinery  · Do not make important personal or business decisions  · Do  not drink alcoholic beverages  · If you have not urinated within 8 hours after discharge, please contact your surgeon on call. Report the following to your surgeon:  · Excessive pain, swelling, redness or odor of or around the surgical area  · Temperature over 100.5  · Nausea and vomiting lasting longer than 4 hours or if unable to take medications  · Any signs of decreased circulation or nerve impairment to extremity: change in color, persistent  numbness, tingling, coldness or increase pain  · Any questions    What to do at Home:    If you experience any of the above symptoms, please follow up with Dr. Sol Mcelroy    *  Please give a list of your current medications to your Primary Care Provider. *  Please update this list whenever your medications are discontinued, doses are      changed, or new medications (including over-the-counter products) are added. *  Please carry medication information at all times in case of emergency situations. These are general instructions for a healthy lifestyle:    No smoking/ No tobacco products/ Avoid exposure to second hand smoke  Surgeon General's Warning:  Quitting smoking now greatly reduces serious risk to your health.     Obesity, smoking, and sedentary lifestyle greatly increases your risk for illness    A healthy diet, regular physical exercise & weight monitoring are important for maintaining a healthy lifestyle    You may be retaining fluid if you have a history of heart failure or if you experience any of the following symptoms: Weight gain of 3 pounds or more overnight or 5 pounds in a week, increased swelling in our hands or feet or shortness of breath while lying flat in bed. Please call your doctor as soon as you notice any of these symptoms; do not wait until your next office visit. Recognize signs and symptoms of STROKE:    F-face looks uneven    A-arms unable to move or move unevenly    S-speech slurred or non-existent    T-time-call 911 as soon as signs and symptoms begin-DO NOT go       Back to bed or wait to see if you get better-TIME IS BRAIN. Warning Signs of HEART ATTACK     Call 911 if you have these symptoms:   Chest discomfort. Most heart attacks involve discomfort in the center of the chest that lasts more than a few minutes, or that goes away and comes back. It can feel like uncomfortable pressure, squeezing, fullness, or pain.  Discomfort in other areas of the upper body. Symptoms can include pain or discomfort in one or both arms, the back, neck, jaw, or stomach.  Shortness of breath with or without chest discomfort.  Other signs may include breaking out in a cold sweat, nausea, or lightheadedness. Don't wait more than five minutes to call 911 - MINUTES MATTER! Fast action can save your life. Calling 911 is almost always the fastest way to get lifesaving treatment. Emergency Medical Services staff can begin treatment when they arrive -- up to an hour sooner than if someone gets to the hospital by car. The discharge information has been reviewed with the patient and caregiver. The patient and caregiver verbalized understanding. Discharge medications reviewed with the patient and caregiver and appropriate educational materials and side effects teaching were provided.

## 2018-11-06 NOTE — PERIOP NOTES
1520:  Patient became nauseated while getting dressed. Inc IV infusion and waited until she was more comfortable. No emisis and felt ready to go home.

## 2018-11-08 NOTE — OP NOTES
[address] [place] [address] REPORT    Saad Alonzo  MR#: [id number]  : 1980  ACCOUNT #: [de-identified]   DATE OF SERVICE: 2018    PREOPERATIVE DIAGNOSES:  1. Right breast cancer. 2.  Status post immediate breast reconstruction using tissue expander and AlloDerm. 3.  Disproportion of reconstructed breast.  4.  Deformity of reconstructed breast.    POSTOPERATIVE DIAGNOSES:    1. Right breast cancer. 2.  Status post immediate breast reconstruction using tissue expander and AlloDerm. 3.  Disproportion of reconstructed breast.  4.  Deformity of reconstructed breast.    PROCEDURES PERFORMED:  1. Removal of right breast tissue expander. 2.  Right capsulotomy. 3.  Revision of right reconstructed breast.  4.  Placement of right permanent breast implant. 5.  Reduction of the left breast for symmetry. SURGEON:  John Jensen MD    ANESTHESIA:  General.    DRAINS:  None. SPECIMENS REMOVED:  None. IMPLANTS:  As follows: On the right side, the reference number [id number] [employer]ana Inspira breast implant, SN number is [id number]. The amount that it holds is 615 mL. ASSISTANT:  Aura Rodriguez    COMPLICATIONS:  None. ESTIMATED BLOOD LOSS:  Approximately 40 mL. INDICATIONS FOR SURGERY:  The patient is a 79-year-old woman who has a history of right-sided breast cancer. She was treated with a right mastectomy and opted for immediate breast reconstruction using tissue expander. She has been completely expanded and is here for exchange of the expander for permanent implant. Additionally, she has a left breast that is much larger than the right and will have a reduction mammoplasty in order to obtain better symmetry. After the patient signed informed consent, she was marked in the preoperative holding area in the upright position. The left breast was marked using a reduction technique using a vertical skin pattern. The intent was to create a superior medial pedicle. After the patient was marked, she was taken to the operating room, placed on the operating room table in the supine position. She was placed under general endotracheal anesthesia without complication. A timeout was performed in order to verify the patient's identity and surgical sites, which were both correct. She was given preoperative antibiotics, which consisted of IV Ancef. She was injected with local anesthesia, which consisted of 1% lidocaine with epinephrine and 0.5% Marcaine with epinephrine for local anesthesia and hemostasis. I began on the right side and created an incision over the previous surgical scar using a #10 blade. Dissection was carried up in a superior direction using a stair-step technique with electrocautery. The expander was identified and removed in its entirety. It was apparent that there was some contracture of the capsule in the superior aspect and this was released using electrocautery in the form of a capsulotomy. It was evident that the breast pocket was too lateral and this needed to be made more medial and this was extended medially using electrocautery and blunt dissection. After this portion of the revision was performed, a temporary sizer was placed into the breast pocket and temporarily closed. At this point, I turned my attention to the contralateral side where I performed a reduction mammoplasty using a superior medial pedicle. The pedicle was de-epithelialized using a #10 blade, and once this was performed, the pedicle was isolated down to the pectoralis major muscle fascia using electrocautery. The lateral and inferior portion of the vertical reduction technique were excised and sent to pathology for permanent section. The vertical limb was measured at 6 cm and everything inferior to this was removed in the form of a dog ear, using a 10 blade and electrocautery. The left breast tissue weighed approximately 137 g and was sent to pathology for permanent section. The operative site was irrigated copiously using bacitracin and saline solution. The patient was temporarily closed and she was placed in the upright and sitting position to evaluate for size and symmetry, which appeared to be very close in terms of her size and volume. Both were aesthetically pleasing. The patient was placed into the supine position and the temporary staples were removed. The vertical pillars were closed on the left side using an interrupted 3-0 PDS suture. The deep dermis was closed using an interrupted 3-0 Monocryl and the skin was closed using a running subcuticular 4-0 Monocryl. The T site was closed using an interrupted 3-0 PDS suture. At this point, I took out temporary sizer on the right side and placed a permanent implant. After preparing it on the back table with antibiotic irrigation and irrigating the pocket with triple antibiotic irrigation was consisted of bacitracin, Ancef and gentamicin into the sterile saline. New gloves were donned and the implant was placed using a Ford funnel for a no-touch technique. At this point, the capsule was closed over, which was secured using an interrupted 3-0 Monocryl over a fish retractor to avoid any injury to the underlying implant. This retractor was removed prior to complete closure. The deep dermis was closed using   interrupted 3-0 Monocryl and the skin was closed using a running subcuticular 4-0 Monocryl. The dressing consisted of bacitracin ointment, Xeroform, 4 x 4's, ABD and a surgical bra. There were no complications during the procedure. The patient tolerated the procedure without complication. The instrument, sponge and needle count was correct at the conclusion of the case.       Renita Ganser, MD SA / ELVIA  D: 11/08/2018 12:33     T: 11/08/2018 15:05  JOB #: [id number]

## 2018-12-24 LAB — EF %, EXTERNAL: NORMAL

## 2019-04-25 LAB — EF %, EXTERNAL: NORMAL

## 2019-07-15 ENCOUNTER — OFFICE VISIT (OUTPATIENT)
Dept: INTERNAL MEDICINE CLINIC | Age: 39
End: 2019-07-15

## 2019-07-15 VITALS
BODY MASS INDEX: 28.12 KG/M2 | RESPIRATION RATE: 16 BRPM | DIASTOLIC BLOOD PRESSURE: 82 MMHG | SYSTOLIC BLOOD PRESSURE: 124 MMHG | TEMPERATURE: 98.3 F | OXYGEN SATURATION: 98 % | HEART RATE: 96 BPM | WEIGHT: 175 LBS | HEIGHT: 66 IN

## 2019-07-15 DIAGNOSIS — J06.9 VIRAL UPPER RESPIRATORY TRACT INFECTION: Primary | ICD-10-CM

## 2019-07-15 RX ORDER — IBUPROFEN 200 MG
200 TABLET ORAL AS NEEDED
COMMUNITY
End: 2021-07-27

## 2019-07-15 NOTE — PROGRESS NOTES
Cough and sore throat, nose sore. Just completed treatment for breast cancer. Child diagnosed with Croup.

## 2019-07-15 NOTE — PATIENT INSTRUCTIONS
Mucinex 1 tablet twice daily for nose/chest congestion  Flonase nasal spray 2 sprays each nostril daily (for nasal congestion, allergies)    Gargle with warm salt water  Increase fluids  Call or return to clinic if these symptoms worsen or fail to improve as anticipated.

## 2019-07-22 ENCOUNTER — TELEPHONE (OUTPATIENT)
Dept: INTERNAL MEDICINE CLINIC | Age: 39
End: 2019-07-22

## 2019-07-22 NOTE — TELEPHONE ENCOUNTER
Pt says she is not any better and her throat is also sore. She states that it hurts to swallow. She has been taking the OTC's recommended except for Flonase. Pt would like a call to see about additional medication.

## 2020-07-23 ENCOUNTER — DOCUMENTATION ONLY (OUTPATIENT)
Dept: SURGERY | Age: 40
End: 2020-07-23

## 2020-07-23 NOTE — PROGRESS NOTES
Sent message to Dr Josephine Blakely, about receiving HELLO message from the patient asking to schedule her port-removal.

## 2020-07-27 ENCOUNTER — DOCUMENTATION ONLY (OUTPATIENT)
Dept: SURGERY | Age: 40
End: 2020-07-27

## 2020-07-27 NOTE — PROGRESS NOTES
I just spoke with the patient and she is fine to wait until 1st or second week in September for her port to be removed at [facility]. I will forward this message to Dr Joaquina Blanton.

## 2020-08-19 DIAGNOSIS — C50.811 MALIGNANT NEOPLASM OF OVERLAPPING SITES OF RIGHT FEMALE BREAST, UNSPECIFIED ESTROGEN RECEPTOR STATUS (HCC): Primary | ICD-10-CM

## 2020-08-31 ENCOUNTER — DOCUMENTATION ONLY (OUTPATIENT)
Dept: SURGERY | Age: 40
End: 2020-08-31

## 2020-08-31 NOTE — PROGRESS NOTES
Confirmation of Certification Request           Certification Number: [id number] ([address] (REQ CAT = HS) Hillary Finn )    Member Number: [id number]     Member Name: Benjamin Toscano    Diagnosis Code: [id number] - MALIGNANT NEOPLASM [address] RT FEMALE BREAST    Action Code: A1 Certified        Requesting Provider: Name: Bhupinder Alonzo MD     Provider Id: [id number]        [facility]: [address] [facility] [address] [address]     Provider Id: [id number]        [place] [address] Professional: Bhupinder Alonzo MD   [address] 1979     Provider Id: [id number]        Service: Type: SURGICAL PROCEDURE REVIEW  Code: 55286 - REMOVAL OF TUNNELED CV ACCESS DEVICE WITH SUBQ PORT  Certification Effective Date: 94/22/7241  Certification Expiration Date: 36/79/4488  Units Certified: 1  Unit Type: Radha Greenwood

## 2020-09-08 ENCOUNTER — HOSPITAL ENCOUNTER (OUTPATIENT)
Dept: PREADMISSION TESTING | Age: 40
Discharge: HOME | End: 2020-09-08

## 2020-09-08 VITALS
BODY MASS INDEX: 26.22 KG/M2 | SYSTOLIC BLOOD PRESSURE: 129 MMHG | HEART RATE: 89 BPM | TEMPERATURE: 98.5 F | DIASTOLIC BLOOD PRESSURE: 89 MMHG | HEIGHT: 66 IN | WEIGHT: 163.14 LBS

## 2020-09-08 RX ORDER — MELATONIN
DAILY
COMMUNITY
End: 2021-07-27

## 2020-09-08 RX ORDER — AMLODIPINE BESYLATE 5 MG/1
5 TABLET ORAL
COMMUNITY

## 2020-09-08 NOTE — PERIOP NOTES
PREOPERATIVE INSTRUCTIONS REVIEWED WITH PATIENT. PATIENT GIVEN TWO-- BOTTLES OF CHG SOAPS  INSTRUCTIONS REVIEWED ON USE OF CHG SOAPS   PATIENT GIVEN SSI INFECTIONS SHEET. PATIENT WAS GIVEN THE OPPORTUNITY TO ASK QUESTIONS ON THE INFORMATION PROVIDED. PT  MADE AWARE OF COVID 19 TESTING NEEDED TO BE DONE WITHIN 96  HOURS OF SURGERY. PT INSTRUCTED ON SELF QUARANTINE  BETWEEN TESTING AND ARRIVAL TIME  ON DAY OF SURGERY. INSTRUCTION PROVIDED FOR CELL PHONE WAITING AREA, PT INFORMATION AND INSTRUCTIONS FOR APPOINTMENTS AT [facility] ON DAY OF SURGERY.

## 2020-09-11 ENCOUNTER — HOSPITAL ENCOUNTER (OUTPATIENT)
Dept: PREADMISSION TESTING | Age: 40
Discharge: HOME | End: 2020-09-11
Payer: COMMERCIAL

## 2020-09-11 DIAGNOSIS — Z01.812 PRE-PROCEDURE LAB EXAM: ICD-10-CM

## 2020-09-11 PROCEDURE — 87635 SARS-COV-2 COVID-19 AMP PRB: CPT

## 2020-09-12 LAB — SARS-COV-2, COV2NT: NOT DETECTED

## 2020-09-14 ENCOUNTER — ANESTHESIA EVENT (OUTPATIENT)
Dept: MEDSURG UNIT | Age: 40
End: 2020-09-14
Payer: COMMERCIAL

## 2020-09-14 RX ORDER — FENTANYL CITRATE 50 UG/ML
25 INJECTION, SOLUTION INTRAMUSCULAR; INTRAVENOUS
Status: CANCELLED | OUTPATIENT
Start: 2020-09-14

## 2020-09-14 RX ORDER — SODIUM CHLORIDE 0.9 % (FLUSH) 0.9 %
5-40 SYRINGE (ML) INJECTION AS NEEDED
Status: CANCELLED | OUTPATIENT
Start: 2020-09-14

## 2020-09-14 RX ORDER — HYDROMORPHONE HYDROCHLORIDE 1 MG/ML
0.2 INJECTION, SOLUTION INTRAMUSCULAR; INTRAVENOUS; SUBCUTANEOUS
Status: CANCELLED | OUTPATIENT
Start: 2020-09-14

## 2020-09-14 RX ORDER — OXYCODONE HYDROCHLORIDE 5 MG/1
5 TABLET ORAL
Status: CANCELLED | OUTPATIENT
Start: 2020-09-14

## 2020-09-14 RX ORDER — ONDANSETRON 2 MG/ML
4 INJECTION INTRAMUSCULAR; INTRAVENOUS AS NEEDED
Status: CANCELLED | OUTPATIENT
Start: 2020-09-14

## 2020-09-14 RX ORDER — SODIUM CHLORIDE 0.9 % (FLUSH) 0.9 %
5-40 SYRINGE (ML) INJECTION EVERY 8 HOURS
Status: CANCELLED | OUTPATIENT
Start: 2020-09-14

## 2020-09-15 ENCOUNTER — HOSPITAL ENCOUNTER (OUTPATIENT)
Age: 40
Setting detail: OUTPATIENT SURGERY
Discharge: HOME | End: 2020-09-15
Attending: SURGERY | Admitting: SURGERY
Payer: COMMERCIAL

## 2020-09-15 ENCOUNTER — ANESTHESIA (OUTPATIENT)
Dept: MEDSURG UNIT | Age: 40
End: 2020-09-15
Payer: COMMERCIAL

## 2020-09-15 VITALS
WEIGHT: 163.14 LBS | TEMPERATURE: 97.8 F | RESPIRATION RATE: 20 BRPM | SYSTOLIC BLOOD PRESSURE: 137 MMHG | BODY MASS INDEX: 26.33 KG/M2 | HEART RATE: 103 BPM | OXYGEN SATURATION: 96 % | DIASTOLIC BLOOD PRESSURE: 90 MMHG

## 2020-09-15 DIAGNOSIS — C50.811 MALIGNANT NEOPLASM OF OVERLAPPING SITES OF RIGHT FEMALE BREAST, UNSPECIFIED ESTROGEN RECEPTOR STATUS (HCC): ICD-10-CM

## 2020-09-15 DIAGNOSIS — C50.811 CANCER OF OVERLAPPING SITES OF RIGHT BREAST (HCC): ICD-10-CM

## 2020-09-15 LAB
HCG UR QL: NEGATIVE
HGB BLD-MCNC: 14.4 G/DL (ref 11.5–16)

## 2020-09-15 PROCEDURE — 2709999900 HC NON-CHARGEABLE SUPPLY: Performed by: SURGERY

## 2020-09-15 PROCEDURE — 76060000061 HC AMB SURG ANES 0.5 TO 1 HR: Performed by: SURGERY

## 2020-09-15 PROCEDURE — 76210000034 HC AMBSU PH I REC 0.5 TO 1 HR: Performed by: SURGERY

## 2020-09-15 PROCEDURE — 77030011267 HC ELECTRD BLD COVD -A: Performed by: SURGERY

## 2020-09-15 PROCEDURE — 74011000250 HC RX REV CODE- 250: Performed by: SURGERY

## 2020-09-15 PROCEDURE — 74011250636 HC RX REV CODE- 250/636: Performed by: NURSE ANESTHETIST, CERTIFIED REGISTERED

## 2020-09-15 PROCEDURE — 77030040922 HC BLNKT HYPOTHRM STRY -A

## 2020-09-15 PROCEDURE — 77030031139 HC SUT VCRL2 J&J -A: Performed by: SURGERY

## 2020-09-15 PROCEDURE — 76030000000 HC AMB SURG OR TIME 0.5 TO 1: Performed by: SURGERY

## 2020-09-15 PROCEDURE — 77030002933 HC SUT MCRYL J&J -A: Performed by: SURGERY

## 2020-09-15 PROCEDURE — 77030010507 HC ADH SKN DERMBND J&J -B: Performed by: SURGERY

## 2020-09-15 PROCEDURE — 36590 REMOVAL TUNNELED CV CATH: CPT | Performed by: SURGERY

## 2020-09-15 PROCEDURE — 85018 HEMOGLOBIN: CPT

## 2020-09-15 PROCEDURE — 81025 URINE PREGNANCY TEST: CPT

## 2020-09-15 PROCEDURE — 2709999900 HC NON-CHARGEABLE SUPPLY

## 2020-09-15 RX ORDER — ACETAMINOPHEN 325 MG/1
650 TABLET ORAL
COMMUNITY

## 2020-09-15 RX ORDER — SODIUM CHLORIDE 0.9 % (FLUSH) 0.9 %
5-40 SYRINGE (ML) INJECTION AS NEEDED
Status: DISCONTINUED | OUTPATIENT
Start: 2020-09-15 | End: 2020-09-15 | Stop reason: HOSPADM

## 2020-09-15 RX ORDER — SODIUM CHLORIDE, SODIUM LACTATE, POTASSIUM CHLORIDE, CALCIUM CHLORIDE 600; 310; 30; 20 MG/100ML; MG/100ML; MG/100ML; MG/100ML
50 INJECTION, SOLUTION INTRAVENOUS CONTINUOUS
Status: DISCONTINUED | OUTPATIENT
Start: 2020-09-15 | End: 2020-09-15 | Stop reason: HOSPADM

## 2020-09-15 RX ORDER — SODIUM CHLORIDE 0.9 % (FLUSH) 0.9 %
5-40 SYRINGE (ML) INJECTION EVERY 8 HOURS
Status: DISCONTINUED | OUTPATIENT
Start: 2020-09-15 | End: 2020-09-15 | Stop reason: HOSPADM

## 2020-09-15 RX ORDER — PROPOFOL 10 MG/ML
INJECTION, EMULSION INTRAVENOUS AS NEEDED
Status: DISCONTINUED | OUTPATIENT
Start: 2020-09-15 | End: 2020-09-15 | Stop reason: HOSPADM

## 2020-09-15 RX ORDER — SODIUM CHLORIDE, SODIUM LACTATE, POTASSIUM CHLORIDE, CALCIUM CHLORIDE 600; 310; 30; 20 MG/100ML; MG/100ML; MG/100ML; MG/100ML
INJECTION, SOLUTION INTRAVENOUS
Status: DISCONTINUED | OUTPATIENT
Start: 2020-09-15 | End: 2020-09-15 | Stop reason: HOSPADM

## 2020-09-15 RX ORDER — MIDAZOLAM HYDROCHLORIDE 1 MG/ML
INJECTION, SOLUTION INTRAMUSCULAR; INTRAVENOUS AS NEEDED
Status: DISCONTINUED | OUTPATIENT
Start: 2020-09-15 | End: 2020-09-15 | Stop reason: HOSPADM

## 2020-09-15 RX ORDER — LIDOCAINE HYDROCHLORIDE 10 MG/ML
0.1 INJECTION, SOLUTION EPIDURAL; INFILTRATION; INTRACAUDAL; PERINEURAL AS NEEDED
Status: DISCONTINUED | OUTPATIENT
Start: 2020-09-15 | End: 2020-09-15 | Stop reason: HOSPADM

## 2020-09-15 RX ADMIN — PROPOFOL 60 MG: 10 INJECTION, EMULSION INTRAVENOUS at 08:43

## 2020-09-15 RX ADMIN — PROPOFOL 50 MG: 10 INJECTION, EMULSION INTRAVENOUS at 08:51

## 2020-09-15 RX ADMIN — SODIUM CHLORIDE, POTASSIUM CHLORIDE, SODIUM LACTATE AND CALCIUM CHLORIDE: 600; 310; 30; 20 INJECTION, SOLUTION INTRAVENOUS at 08:36

## 2020-09-15 RX ADMIN — PROPOFOL 50 MG: 10 INJECTION, EMULSION INTRAVENOUS at 09:06

## 2020-09-15 RX ADMIN — PROPOFOL 50 MG: 10 INJECTION, EMULSION INTRAVENOUS at 08:46

## 2020-09-15 RX ADMIN — PROPOFOL 50 MG: 10 INJECTION, EMULSION INTRAVENOUS at 09:01

## 2020-09-15 RX ADMIN — PROPOFOL 50 MG: 10 INJECTION, EMULSION INTRAVENOUS at 08:56

## 2020-09-15 RX ADMIN — PROPOFOL 50 MG: 10 INJECTION, EMULSION INTRAVENOUS at 08:49

## 2020-09-15 RX ADMIN — PROPOFOL 50 MG: 10 INJECTION, EMULSION INTRAVENOUS at 08:45

## 2020-09-15 RX ADMIN — MEPERIDINE HYDROCHLORIDE 25 MG: 50 INJECTION INTRAMUSCULAR; INTRAVENOUS; SUBCUTANEOUS at 08:36

## 2020-09-15 RX ADMIN — MIDAZOLAM 2 MG: 1 INJECTION INTRAMUSCULAR; INTRAVENOUS at 08:36

## 2020-09-15 RX ADMIN — PROPOFOL 50 MG: 10 INJECTION, EMULSION INTRAVENOUS at 09:03

## 2020-09-15 RX ADMIN — PROPOFOL 40 MG: 10 INJECTION, EMULSION INTRAVENOUS at 08:44

## 2020-09-15 RX ADMIN — PROPOFOL 50 MG: 10 INJECTION, EMULSION INTRAVENOUS at 08:59

## 2020-09-15 RX ADMIN — PROPOFOL 50 MG: 10 INJECTION, EMULSION INTRAVENOUS at 08:53

## 2020-09-15 NOTE — DISCHARGE INSTRUCTIONS
Discharge Instructions from Dr. Ely Causey    · I will call you with the pathology results, typically within 1 week from today. · You may shower, but no hot tubs, swimming pools, or baths until your incision is healed. · No heavy lifting with the affected extremity (nothing greater than 5 pounds), and limit its use for the next 4-5 days. · You may use an ice pack for comfort for the next couple of days, but do not place ice directly on the skin. Rather, use a towel or clothing to serve as a barrier between skin and ice to prevent injury. · If I placed a drain, follow the drain instructions provided, especially as you keep a record of the drain output. · Follow medication instructions carefully. May use tylenol or ibuprofen for soreness. ·   · You will have bruising and swelling  · Watch for signs of infection as listed below. · Redness  · Swelling  · Drainage from the incision or from your nipple that appears infected  · Fever over 101.5 degrees for consecutive readings, or over 99.5 if you are currently undergoing chemotherapy. · Call our office (number is below) for a follow-up appointment. · If you have any problems, our phone number is [phone]    DISCHARGE SUMMARY from Nurse    PATIENT INSTRUCTIONS:    After general anesthesia or intravenous sedation, for 24 hours or while taking prescription Narcotics:  · Limit your activities  · Do not drive and operate hazardous machinery  · Do not make important personal or business decisions  · Do  not drink alcoholic beverages  · If you have not urinated within 8 hours after discharge, please contact your surgeon on call.     Report the following to your surgeon:  · Excessive pain, swelling, redness or odor of or around the surgical area  · Temperature over 100.5  · Nausea and vomiting lasting longer than 4 hours or if unable to take medications  · Any signs of decreased circulation or nerve impairment to extremity: change in color, persistent  numbness, tingling, coldness or increase pain  · Any questions          ___________________________________________________________________________________________________________________________________

## 2020-09-15 NOTE — OP NOTES
[address]   OPERATIVE REPORT    Name:  Noemy Albert  MR#:  [id number]  :  1980  ACCOUNT #:  [de-identified]  DATE OF SERVICE:  09/15/2020    PREOPERATIVE DIAGNOSIS:  History of right breast cancer, need for port removal.    POSTOPERATIVE DIAGNOSIS:  History of right breast cancer, need for port removal.    PROCEDURE PERFORMED:  Left IJ port removal.    SURGEON:  Nahum Quiñonez MD    ASSISTANT:  LALI Mendoza    ANESTHESIA:  MAC.    COMPLICATIONS:  None. SPECIMENS REMOVED:  None. IMPLANTS:  None. ESTIMATED BLOOD LOSS:  Minimal.    DRAINS:  None. FINDINGS:  Port removed intact. INDICATIONS FOR PROCEDURE:  This is a 79-year-old female, who had underwent mastectomy and node biopsy with reconstruction. She had adjuvant HER2 blockade and chemotherapy and now it is time to remove her port. PROCEDURE IN DETAIL:  The patient was seen in preoperative holding area where surgical site was marked by surgeon. Informed consent was obtained. She was taken to the operating room, laid in supine position where MAC anesthesia was induced. The left chest wall was prepped and draped in usual fashion and time-out was performed. Attention was turned to the left chest wall where 20 mL of local anesthetic was injected around the port site. A 15-blade was used to make an incision in the prior scar. Bovie cautery was used to dissect through the port capsule. The catheter was clamped and removed first.  Pressure was held on the neck insertion site. Next, heavy scissors were used to cut the Prolene on either side. Bovie cautery was used obtain hemostasis. The port tract was oversewn with 3-0 Vicryl. The skin was closed with interrupted 3-0 Vicryl and 4-0 subcuticular Monocryl. Skin glue was placed on incision. All sponge, needle and instrument counts were correct. The patient went to Recovery in stable condition.       Ama Hernandez MD      [email or web address]  D: 09/15/2020 9:15  T:  09/15/2020 9:53  JOB #:  [id number]

## 2020-09-15 NOTE — ROUTINE PROCESS
Patient: Nick Lopez MRN: [id number]  SSN: [id number] YOB: 1980  Age: 38 y.o. Sex: female Patient is status post Procedure(s): PORTACATH REMOVAL. Surgeon(s) and Role: Ольга Reagan MD - Primary Local/Dose/Irrigation:  SEE MAR Dressing/Packing:  Wound Chest Left-Dressing Type: Topical skin adhesive/glue (09/15/20 0905) Splint/Cast:  ] Other:

## 2020-09-15 NOTE — BRIEF OP NOTE
Brief Postoperative Note    Patient: Pina Sesay  YOB: 1980  MRN: [id number]    Date of Procedure: 9/15/2020     Pre-Op Diagnosis: RIGHT BREAST CANCER OVERLAPPING SITES    Post-Op Diagnosis: Same as preoperative diagnosis. Procedure(s):  PORTACATH REMOVAL    Surgeon(s):   Sai Miller MD    Surgical Assistant: None    Anesthesia: MAC     Estimated Blood Loss (mL): Minimal    Complications: None    Specimens: * No specimens in log *     Implants: * No implants in log *    Drains: * No LDAs found *    Findings: port removed intact     Electronically Signed by Larry Bear MD on 9/15/2020 at 9:12 AM    Dictated stat

## 2020-09-15 NOTE — H&P
History and Physical    HISTORY OF PRESENT ILLNESS  Tyler Orta is a 44 y.o. female. HPI  ESTABLISHED patient here for post op follow up, s/p RIGHT mastectomy, SNBx, reconstruction with tissue Expander. Patient is doing well. Continues to have pain and taking her pain medication. Followed up with Dr. Cece Michelle yesterday and drains were removed. Patient feels as if she is healing well.     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 5/22/18 - RIGHT mastectomy, SNbx, with reconstruction, TE - pathology showed IDC 8 mm and DCIS, ER 0%, TX 0%, HER2 3+  Separate nodule 8 mm resected separately, not true + margin.         Review of Systems   All other systems reviewed and are negative.        Physical Exam   Pulmonary/Chest:       Right breast surgically absent. Expander intact. Drain removed already. Nursing note and vitals reviewed.        ASSESSMENT and PLAN     ICD-10-CM ICD-9-CM     1. [facility] of overlapping sites of right [facility] C50.811 174. 8       45 yo multicentric dcis and idc, grade 3, stage 1 (8 mm) er/pr negative, her 2 orion 3+. S/p mastectomy, sln biopsy.      - port removal

## 2020-09-15 NOTE — ANESTHESIA PREPROCEDURE EVALUATION
Relevant Problems   No relevant active problems       Anesthetic History   No history of anesthetic complications            Review of Systems / Medical History  Patient summary reviewed, nursing notes reviewed and pertinent labs reviewed    Pulmonary  Within defined limits                 Neuro/Psych   Within defined limits           Cardiovascular    Hypertension        Dysrhythmias       Exercise tolerance: >4 METS     GI/Hepatic/Renal                Endo/Other             Other Findings              Physical Exam    Airway  Mallampati: I  TM Distance: > 6 cm  Neck ROM: normal range of motion   Mouth opening: Normal     Cardiovascular    Rhythm: regular  Rate: normal         Dental  No notable dental hx       Pulmonary  Breath sounds clear to auscultation               Abdominal         Other Findings            Anesthetic Plan    ASA: 2  Anesthesia type: MAC          Induction: Intravenous  Anesthetic plan and risks discussed with: Patient

## 2020-09-16 NOTE — ANESTHESIA POSTPROCEDURE EVALUATION
Post-Anesthesia Evaluation and Assessment    Patient: Cindy Turner MRN: [id number]  SSN: [id number]    YOB: 1980  Age: 38 y.o. Sex: female      I have evaluated the patient and they are stable and ready for discharge from the PACU. Cardiovascular Function/Vital Signs  Visit Vitals  BP (!) 137/90   Pulse (!) 103   Temp 36.6 °C (97.8 °F)   Resp 20   Wt 74 kg (163 lb 2.3 oz)   SpO2 96%   BMI 26.33 kg/m²       Patient is status post MAC anesthesia for Procedure(s):  PORTACATH REMOVAL. Nausea/Vomiting: None    Postoperative hydration reviewed and adequate. Pain:  Pain Scale 1: Numeric (0 - 10) (09/15/20 0945)  Pain Intensity 1: 0 (09/15/20 0945)   Managed    Neurological Status:   Neuro (WDL): Within Defined Limits (09/15/20 0945)  Neuro  LUE Motor Response: Purposeful;Spontaneous  (09/15/20 0914)  LLE Motor Response: Purposeful;Spontaneous  (09/15/20 0914)  RUE Motor Response: Purposeful;Spontaneous  (09/15/20 0914)  RLE Motor Response: Purposeful;Spontaneous  (09/15/20 0914)   At baseline    Mental Status, Level of Consciousness: Alert and  oriented to person, place, and time    Pulmonary Status:   O2 Device: Room air (09/15/20 0945)   Adequate oxygenation and airway patent    Complications related to anesthesia: None    Post-anesthesia assessment completed. No concerns    Signed By: Zhanna Oviedo MD     September 15, 2020              Procedure(s):  PORTACATH REMOVAL. MAC    <BSHSIANPOST>    INITIAL Post-op Vital signs:   Vitals Value Taken Time   /90 9/15/2020  9:45 AM   Temp 36.6 °C (97.8 °F) 9/15/2020  9:21 AM   Pulse 116 9/15/2020  9:56 AM   Resp 21 9/15/2020  9:56 AM   SpO2 97 % 9/15/2020  9:56 AM   Vitals shown include unvalidated device data.

## 2021-04-26 ENCOUNTER — OFFICE VISIT (OUTPATIENT)
Dept: SURGERY | Age: 41
End: 2021-04-26
Payer: COMMERCIAL

## 2021-04-26 VITALS — DIASTOLIC BLOOD PRESSURE: 95 MMHG | SYSTOLIC BLOOD PRESSURE: 143 MMHG | HEART RATE: 109 BPM

## 2021-04-26 DIAGNOSIS — Z85.3 HISTORY OF RIGHT BREAST CANCER: Primary | ICD-10-CM

## 2021-04-26 PROCEDURE — 99214 OFFICE O/P EST MOD 30 MIN: CPT | Performed by: SURGERY

## 2021-04-26 PROCEDURE — 76642 ULTRASOUND BREAST LIMITED: CPT | Performed by: SURGERY

## 2021-04-26 NOTE — PROGRESS NOTES
HISTORY OF PRESENT ILLNESS  Svetlana Puckett is a 38 y.o. female. HPI  ESTABLISHED patient here for follow up LEFT breast pain. Having complaints of pain for about a month to LEFT breast. The pain comes and goes. It is in her breast and sometimes goes down her LEFT arm. Denies palpable lumps. In January, had long period, was prescribed medroxyprogesterone for 10 days. Ended up developing a rash on legs up to her neck. It is finally going away. 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 5/22/18 - RIGHT mastectomy, SNbx, with reconstruction, TE - pathology showed IDC 8 mm and DCIS, ER 0%, MD 0%, HER2 3+  Separate nodule 8 mm resected separately, not true + margin. LEFT breast Mammogram at [address], in the fall of 2020. She was told it was fine. Review of Systems   All other systems reviewed and are negative. Physical Exam  Vitals signs and nursing note reviewed. Chest:      Breasts: Breasts are symmetrical.         Right: No inverted nipple, mass, nipple discharge, skin change or tenderness. Left: Tenderness present. No inverted nipple, mass, nipple discharge or skin change. Lymphadenopathy:      Cervical: No cervical adenopathy. BREAST ULTRASOUND  Indication: left breast pain  Technique: The area was scanned using a high-frequency linear-array near-field transducer  Findings: No abnormal mass, lesion, or shadowing noted. No cysts  Impression: Normal dense fibrocystic breast tissue   Disposition: No worrisome finding on ultrasound    ASSESSMENT and PLAN    ICD-10-CM ICD-9-CM    1. History of right breast cancer  Z85.3 V10.3      37 yo female with h/o right breast cancer   Pain on left  Exam and ultrasound normal  Recommended mri for screening. She doesn't want this now   F/u in 6 months  30 minutes was spent with patient on counseling and coordination of care.

## 2021-04-26 NOTE — LETTER
4/28/2021 Patient: Arabella Lyon YOB: 1980 Date of Visit: 4/26/2021 Sirisha Monzon MD 
[place] [address] [address] [place] [id number] [address] [employer] Dear Sirisha Monzon MD, Thank you for referring Ms. Marialuisa Salazar to [address] MAIN OFFICE [address] [facility] for evaluation. My notes for this consultation are attached. If you have questions, please do not hesitate to call me. I look forward to following your patient along with you. Sincerely, Arelis Cortes MD

## 2021-05-19 LAB — CREATININE, EXTERNAL: 0.74

## 2021-07-27 ENCOUNTER — OFFICE VISIT (OUTPATIENT)
Dept: INTERNAL MEDICINE CLINIC | Age: 41
End: 2021-07-27
Payer: COMMERCIAL

## 2021-07-27 VITALS
TEMPERATURE: 98.4 F | OXYGEN SATURATION: 98 % | HEART RATE: 102 BPM | HEIGHT: 66 IN | SYSTOLIC BLOOD PRESSURE: 135 MMHG | DIASTOLIC BLOOD PRESSURE: 90 MMHG | WEIGHT: 174 LBS | BODY MASS INDEX: 27.97 KG/M2 | RESPIRATION RATE: 16 BRPM

## 2021-07-27 DIAGNOSIS — Z23 ENCOUNTER FOR IMMUNIZATION: ICD-10-CM

## 2021-07-27 DIAGNOSIS — I10 HYPERTENSION, ESSENTIAL: ICD-10-CM

## 2021-07-27 DIAGNOSIS — Z85.3 HISTORY OF RIGHT BREAST CANCER: ICD-10-CM

## 2021-07-27 DIAGNOSIS — F51.04 PSYCHOPHYSIOLOGICAL INSOMNIA: ICD-10-CM

## 2021-07-27 DIAGNOSIS — Z00.00 ROUTINE PHYSICAL EXAMINATION: Primary | ICD-10-CM

## 2021-07-27 PROBLEM — C50.811 CANCER OF OVERLAPPING SITES OF RIGHT BREAST (HCC): Status: RESOLVED | Noted: 2018-06-01 | Resolved: 2021-07-27

## 2021-07-27 PROBLEM — D05.11 DUCTAL CARCINOMA IN SITU (DCIS) OF RIGHT BREAST: Status: RESOLVED | Noted: 2018-03-30 | Resolved: 2021-07-27

## 2021-07-27 PROCEDURE — 99213 OFFICE O/P EST LOW 20 MIN: CPT | Performed by: INTERNAL MEDICINE

## 2021-07-27 PROCEDURE — 90715 TDAP VACCINE 7 YRS/> IM: CPT

## 2021-07-27 PROCEDURE — 99396 PREV VISIT EST AGE 40-64: CPT | Performed by: INTERNAL MEDICINE

## 2021-07-27 PROCEDURE — 90471 IMMUNIZATION ADMIN: CPT | Performed by: INTERNAL MEDICINE

## 2021-07-27 RX ORDER — TRAZODONE HYDROCHLORIDE 100 MG/1
50-100 TABLET ORAL
Qty: 30 TABLET | Refills: 1 | Status: SHIPPED | OUTPATIENT
Start: 2021-07-27

## 2021-07-27 RX ORDER — HYDROCHLOROTHIAZIDE 12.5 MG/1
12.5 TABLET ORAL DAILY
Qty: 90 TABLET | Refills: 0 | Status: SHIPPED | OUTPATIENT
Start: 2021-07-27 | End: 2021-10-19

## 2021-07-27 NOTE — ASSESSMENT & PLAN NOTE
Uncontrolled, has been managed by oncologist, reviewed guidelines, would like it to be 120's over 70 to low 80's based on her age. Will add in HCTZ.   He will continue to check amb BP at and update me in 3-4 wks

## 2021-07-27 NOTE — PATIENT INSTRUCTIONS
Tdap (Tetanus, Diphtheria, Pertussis) Vaccine: What You Need to Know  Why get vaccinated? Tdap vaccine can prevent tetanus, diphtheria, and pertussis. Diphtheria and pertussis spread from person to person. Tetanus enters the body through cuts or wounds. · TETANUS (T) causes painful stiffening of the muscles. Tetanus can lead to serious health problems, including being unable to open the mouth, having trouble swallowing and breathing, or death. · DIPHTHERIA (D) can lead to difficulty breathing, heart failure, paralysis, or death. · PERTUSSIS (aP), also known as \"whooping cough,\" can cause uncontrollable, violent coughing which makes it hard to breathe, eat, or drink. Pertussis can be extremely serious in babies and young children, causing pneumonia, convulsions, brain damage, or death. In teens and adults, it can cause weight loss, loss of bladder control, passing out, and rib fractures from severe coughing. Tdap vaccine  Tdap is only for children 7 years and older, adolescents, and adults. Adolescents should receive a single dose of Tdap, preferably at age 6 or 15 years. Pregnant women should get a dose of Tdap during every pregnancy, to protect the  from pertussis. Infants are most at risk for severe, life threatening complications from pertussis. Adults who have never received Tdap should get a dose of Tdap. Also, adults should receive a booster dose every 10 years, or earlier in the case of a severe and dirty wound or burn. Booster doses can be either Tdap or Td (a different vaccine that protects against tetanus and diphtheria but not pertussis). Tdap may be given at the same time as other vaccines. Talk with your health care provider  Tell your vaccine provider if the person getting the vaccine:  · Has had an allergic reaction after a previous dose of any vaccine that protects against tetanus, diphtheria, or pertussis, or has any severe, life threatening allergies.   · Has had a coma, decreased level of consciousness, or prolonged seizures within 7 days after a previous dose of any pertussis vaccine (DTP, DTaP, or Tdap). · Has seizures or another nervous system problem. · Has ever had Guillain-Barré Syndrome (also called GBS). · Has had severe pain or swelling after a previous dose of any vaccine that protects against tetanus or diphtheria. In some cases, your health care provider may decide to postpone Tdap vaccination to a future visit. People with minor illnesses, such as a cold, may be vaccinated. People who are moderately or severely ill should usually wait until they recover before getting Tdap vaccine. Your health care provider can give you more information. Risks of a vaccine reaction  · Pain, redness, or swelling where the shot was given, mild fever, headache, feeling tired, and nausea, vomiting, diarrhea, or stomachache sometimes happen after Tdap vaccine. People sometimes faint after medical procedures, including vaccination. Tell your provider if you feel dizzy or have vision changes or ringing in the ears. As with any medicine, there is a very remote chance of a vaccine causing a severe allergic reaction, other serious injury, or death. What if there is a serious problem? An allergic reaction could occur after the vaccinated person leaves the clinic. If you see signs of a severe allergic reaction (hives, swelling of the face and throat, difficulty breathing, a fast heartbeat, dizziness, or weakness), call 9-1-1 and get the person to the nearest hospital.  For other signs that concern you, call your health care provider. Adverse reactions should be reported to the Vaccine Adverse Event Reporting System (VAERS). Your health care provider will usually file this report, or you can do it yourself. Visit the VAERS website at www.vaers. hhs.gov or call 1-325.374.8495. VAERS is only for reporting reactions, and VAERS staff do not give medical advice.   The National Vaccine Injury Compensation Program  The [employer] Injury Compensation Program (VICP) is a federal program that was created to compensate people who may have been injured by certain vaccines. Visit the VICP website at www.[facility].gov/vaccinecompensation or call 2-107.206.6968 to learn about the program and about filing a claim. There is a time limit to file a claim for compensation. How can I learn more? · Ask your health care provider. · Call your local or state health department. · Contact the Centers for Disease Control and Prevention (CDC):  ? Call 5-303.763.7665 (1-800-CDC-INFO) or  ? Visit CDC's website at www.cdc.gov/vaccines  Vaccine Information Statement (Interim)  Tdap (Tetanus, Diphtheria, Pertussis) Vaccine  04/01/2020  [address] ALEXI Stevens [id number]  Department of Health and Human Services  Centers for Disease Control and Prevention  Many Vaccine Information Statements are available in Sami and other languages. See www.immunize.org/vis. Muchas hojas de información sobre vacunas están disponibles en español y en otros idiomas. Visite www.immunize.org/vis. Care instructions adapted under license by [employer] (which disclaims liability or warranty for this information). If you have questions about a medical condition or this instruction, always ask your healthcare professional. [address] any warranty or liability for your use of this information. Well Visit, Ages 25 to 48: Care Instructions  Overview     Well visits can help you stay healthy. Your doctor has checked your overall health and may have suggested ways to take good care of yourself. Your doctor also may have recommended tests. At home, you can help prevent illness with healthy eating, regular exercise, and other steps. Follow-up care is a key part of your treatment and safety. Be sure to make and go to all appointments, and call your doctor if you are having problems.  It's also a good idea to know your test results and keep a list of the medicines you take. How can you care for yourself at home? · Get screening tests that you and your doctor decide on. Screening helps find diseases before any symptoms appear. · Eat healthy foods. Choose fruits, vegetables, whole grains, protein, and low-fat dairy foods. Limit fat, especially saturated fat. Reduce salt in your diet. · Limit alcohol. If you are a man, have no more than 2 drinks a day or 14 drinks a week. If you are a woman, have no more than 1 drink a day or 7 drinks a week. · Get at least 30 minutes of physical activity on most days of the week. Walking is a good choice. You also may want to do other activities, such as running, swimming, cycling, or playing tennis or team sports. Discuss any changes in your exercise program with your doctor. · Reach and stay at a healthy weight. This will lower your risk for many problems, such as obesity, diabetes, heart disease, and high blood pressure. · Do not smoke or allow others to smoke around you. If you need help quitting, talk to your doctor about stop-smoking programs and medicines. These can increase your chances of quitting for good. · Care for your mental health. It is easy to get weighed down by worry and stress. Learn strategies to manage stress, like deep breathing and mindfulness, and stay connected with your family and community. If you find you often feel sad or hopeless, talk with your doctor. Treatment can help. · Talk to your doctor about whether you have any risk factors for sexually transmitted infections (STIs). You can help prevent STIs if you wait to have sex with a new partner (or partners) until you've each been tested for STIs. It also helps if you use condoms (male or female condoms) and if you limit your sex partners to one person who only has sex with you. Vaccines are available for some STIs, such as HPV. · Use birth control if it's important to you to prevent pregnancy.  Talk with your doctor about the choices available and what might be best for you. · If you think you may have a problem with alcohol or drug use, talk to your doctor. This includes prescription medicines (such as amphetamines and opioids) and illegal drugs (such as cocaine and methamphetamine). Your doctor can help you figure out what type of treatment is best for you. · Protect your skin from too much sun. When you're outdoors from 10 a.m. to 4 p.m., stay in the shade or cover up with clothing and a hat with a wide brim. Wear sunglasses that block UV rays. Even when it's cloudy, put broad-spectrum sunscreen (SPF 30 or higher) on any exposed skin. · See a dentist one or two times a year for checkups and to have your teeth cleaned. · Wear a seat belt in the car. When should you call for help? Watch closely for changes in your health, and be sure to contact your doctor if you have any problems or symptoms that concern you. Where can you learn more? Go to http://www.[employer].com/  Enter P072 in the search box to learn more about \"Well Visit, Ages 25 to 48: Care Instructions. \"  Current as of: May 27, 2020               Content Version: 12.8  © 2006-2021 Healthwise, Incorporated. Care instructions adapted under license by [employer] (which disclaims liability or warranty for this information). If you have questions about a medical condition or this instruction, always ask your healthcare professional. [place] [address] any warranty or liability for your use of this information.

## 2021-07-27 NOTE — PROGRESS NOTES
Efrain Clemons is a 38 y.o. female who was seen in clinic today (7/27/2021) for a full physical.       Assessment & Plan:     1. Routine physical examination  Comments:  will get recent specialist labs ([facility]) for review  2. Encounter for immunization  -     TETANUS, DIPHTHERIA TOXOIDS AND ACELLULAR PERTUSSIS VACCINE (TDAP), IN INDIVIDS. >=7, IM  -     AR IMMUNIZ ADMIN,1 SINGLE/COMB VAC/TOXOID  3. History of right breast cancer  Assessment & Plan:   monitored by specialist. No acute findings meriting change in the plan, records requested  4. Hypertension, essential  Assessment & Plan:  Uncontrolled, has been managed by oncologist, reviewed guidelines, would like it to be 120's over 70 to low 80's based on her age. Will add in HCTZ. He will continue to check amb BP at and update me in 3-4 wks   Orders:  -     hydroCHLOROthiazide (HYDRODIURIL) 12.5 mg tablet; Take 1 Tablet by mouth daily. , Normal, Disp-90 Tablet, R-0  5. Psychophysiological insomnia  Assessment & Plan:  new problem, it is impacting her quality of life, sounds like it is mostly at night and not during the day, I reviewed treatment options with her, I recommended to see a counselor, I reviewed life style changes to help improve mood, following changes were made today: will add in Trazadone. Reviewed expectations and dosing. Reviewed alternative sleep medications and daily anxiety medications as other options. Orders:  -     traZODone (DESYREL) 100 mg tablet; Take 0.5-1 Tablets by mouth nightly., Normal, Disp-30 Tablet, R-1    Follow-up and Dispositions    · Return in about 3 months (around 10/27/2021) for Regular follow up. Subjective:   Waddell Boas is here today for a full physical.      She was last seen by me in '18. Saw Dr Severiano Starring in '19.   Since last visit: she reports sleep/anxiety is worse    Health Maintenance  Immunizations:   Covid: up to date  Influenza: declined   Tetanus: not UTD- will do today    Cancer screening:   Cervical: reviewed guidelines, UTD, done by OBGYN, records requested  Breast: reviewed guidelines, UTD, done by OBGYN, records requested. The following sections were reviewed & updated as appropriate: Problem List, Allergies, PMH, PSH, FH, and SH. Prior to Admission medications    Medication Sig Start Date End Date Taking? Authorizing Provider   acetaminophen (TylenoL) 325 mg tablet Take 650 mg by mouth every four (4) hours as needed for Pain. Y Provider, Historical   amLODIPine (NORVASC) 5 mg tablet Take 5 mg by mouth daily (after breakfast). Y Provider, Historical   cholecalciferol (Vitamin D3) (1000 Units /25 mcg) tablet Take  by mouth daily. Provider, Historical   ibuprofen (MOTRIN IB) 200 mg tablet Take 200 mg by mouth as needed for Pain. Provider, Historical          Review of Systems   Psychiatric/Behavioral: The patient has insomnia. She reports after tx stopped she has been having concerns about breast cancer returning. This has been going on for last 2-3 months, has tried melatonin (didn't work), trouble trouble is staying asleep, no issues getting to sleep. No issues w/ anxiety or worrying the day. Only issue at night   All other systems reviewed and are negative. Objective:   Physical Exam  Constitutional:       General: She is not in acute distress. Appearance: She is well-developed. HENT:      Right Ear: Ear canal normal.      Left Ear: Ear canal normal.      Ears:      Comments: Right ear is: 75% occluded with hard cerumen. Left ear is: 75% occluded with hard cerumen. Eyes:      Conjunctiva/sclera: Conjunctivae normal.   Cardiovascular:      Rate and Rhythm: Regular rhythm. Heart sounds: Normal heart sounds. No murmur heard. Pulmonary:      Effort: Pulmonary effort is normal.      Breath sounds: Normal breath sounds. Abdominal:      General: Bowel sounds are normal.      Palpations: Abdomen is soft. Tenderness: There is no abdominal tenderness. Musculoskeletal:      Right lower leg: No edema. Left lower leg: No edema. Lymphadenopathy:      Cervical: No cervical adenopathy.    Psychiatric:         Mood and Affect: Mood and affect normal.            Visit Vitals  BP (!) 135/90   Pulse (!) 102   Temp 98.4 °F (36.9 °C)   Resp 16   Ht 5' 6\" (1.676 m)   Wt 174 lb (78.9 kg)   SpO2 98%   BMI 28.08 kg/m²          Claudia Rodriguez MD

## 2021-07-27 NOTE — PROGRESS NOTES
HOLLY    Heather Marquez  is a 38 y.o. who presents for routine immunizations. Prior to vaccine administration: Consent was obtained. Risks and adverse reactions were discussed. The patient was provided the VIS and they were given an opportunity to ask questions; all questions were addressed. She  denies any symptoms, reactions or allergies that would exclude them from being immunized today. There were no adverse reactions observed post vaccination. Patient was advised to seek medical or call the office with any questions or concerns post vaccination. Patient verbalized understanding.    Medina Bartlett RN

## 2021-07-27 NOTE — Clinical Note
Please get notes/labs from I.   Please get PAP/Mammo from [facility] AT [place]

## 2021-07-27 NOTE — ASSESSMENT & PLAN NOTE
new problem, it is impacting her quality of life, sounds like it is mostly at night and not during the day, I reviewed treatment options with her, I recommended to see a counselor, I reviewed life style changes to help improve mood, following changes were made today: will add in Trazadone. Reviewed expectations and dosing. Reviewed alternative sleep medications and daily anxiety medications as other options.

## 2021-08-05 ENCOUNTER — OFFICE VISIT (OUTPATIENT)
Dept: INTERNAL MEDICINE CLINIC | Age: 41
End: 2021-08-05
Payer: COMMERCIAL

## 2021-08-05 ENCOUNTER — TRANSCRIBE ORDER (OUTPATIENT)
Dept: SCHEDULING | Age: 41
End: 2021-08-05

## 2021-08-05 VITALS
OXYGEN SATURATION: 97 % | BODY MASS INDEX: 28.09 KG/M2 | SYSTOLIC BLOOD PRESSURE: 128 MMHG | TEMPERATURE: 97.5 F | WEIGHT: 174.8 LBS | HEIGHT: 66 IN | HEART RATE: 63 BPM | RESPIRATION RATE: 16 BRPM | DIASTOLIC BLOOD PRESSURE: 72 MMHG

## 2021-08-05 DIAGNOSIS — R10.32 LEFT LOWER QUADRANT ABDOMINAL PAIN: Primary | ICD-10-CM

## 2021-08-05 DIAGNOSIS — N92.6 ABNORMAL MENSTRUAL CYCLE: ICD-10-CM

## 2021-08-05 PROCEDURE — 99213 OFFICE O/P EST LOW 20 MIN: CPT | Performed by: INTERNAL MEDICINE

## 2021-08-05 NOTE — PROGRESS NOTES
Assessment/Plan:       ICD-10-CM ICD-9-CM    1. Left lower quadrant abdominal pain  R10.32 789.04 US PELV NON OBS      HCG QL SERUM      CANCELED: HCG QL SERUM   2. Abnormal menstrual cycle  N92.6 626.9 HCG QL SERUM      CANCELED: HCG QL SERUM      Plan:  -left lower abdominal discomfort , with history of breast cancer. Has uterus and ovaries. Will get ultrasound of pelvis.  -changes in menstruation, will also check HCG levels today. Orders Placed This Encounter    US PELV NON OBS     Standing Status:   Future     Standing Expiration Date:   9/5/2022     Order Specific Question:   Is Patient Pregnant? Answer:   Unknown    HCG QL SERUM     Standing Status:   Future     Number of Occurrences:   1     Standing Expiration Date:   8/5/2022            Follow-up Disposition:       Disclaimer:  Return if symptoms worsen or fail to improve. Advised patient to call back or return to office if symptoms worsen/change/persist.     Discussed expected course/resolution/complications of diagnosis in detail with patient. Medication risks/benefits/costs/interactions/alternatives discussed with patient. Patient was given an after visit summary which includes diagnoses, current medications, & vitals. Patient expressed understanding with the diagnosis and plan. Subjective:      Barbra Rivero is a 38 y.o. female who presents today for left lower abdominal pain. -pain started about 2 weeks ago. Can radiate down into her left leg. Started after playing soccer with her son. Pain decreased if she is moving and if she 'stretches' out her left leg.     -she also notes that she has not had her menstrual cycle since she got her last COVID vaccine 2 months ago. Her menstrual cycles were regular up until her COVID vaccines. -no bowel changes. Current Outpatient Medications   Medication Sig Dispense Refill    hydroCHLOROthiazide (HYDRODIURIL) 12.5 mg tablet Take 1 Tablet by mouth daily.  90 Tablet 0    acetaminophen (TylenoL) 325 mg tablet Take 650 mg by mouth every four (4) hours as needed for Pain.  amLODIPine (NORVASC) 5 mg tablet Take 5 mg by mouth daily (after breakfast).  traZODone (DESYREL) 100 mg tablet Take 0.5-1 Tablets by mouth nightly. (Patient not taking: Reported on 8/5/2021) 30 Tablet 1        Review of Systems    Pertinent items are noted in HPI. Objective:       Visit Vitals  /72   Pulse 63   Temp 97.5 °F (36.4 °C) (Temporal)   Resp 16   Ht 5' 6\" (1.676 m)   Wt 174 lb 12.8 oz (79.3 kg)   SpO2 97%   BMI 28.21 kg/m²     General appearance: alert, cooperative, no distress, appears stated age  Head: Normocephalic, without obvious abnormality, atraumatic  Abdomen: normal findings: no masses palpable, no organomegaly, bowel sounds normal, soft, abnormal findings:  tenderness mild in the LLQ  Extremities: left lower leg with full ROM. No tenderness to palpation to left inguinal region or over left hip joint.

## 2021-08-06 ENCOUNTER — HOSPITAL ENCOUNTER (OUTPATIENT)
Dept: ULTRASOUND IMAGING | Age: 41
Discharge: HOME | End: 2021-08-06
Attending: INTERNAL MEDICINE
Payer: COMMERCIAL

## 2021-08-06 DIAGNOSIS — R10.32 LEFT LOWER QUADRANT ABDOMINAL PAIN: ICD-10-CM

## 2021-08-06 PROCEDURE — 76830 TRANSVAGINAL US NON-OB: CPT

## 2021-08-06 PROCEDURE — 76856 US EXAM PELVIC COMPLETE: CPT

## 2021-08-10 ENCOUNTER — TELEPHONE (OUTPATIENT)
Dept: INTERNAL MEDICINE CLINIC | Age: 41
End: 2021-08-10

## 2021-08-10 NOTE — TELEPHONE ENCOUNTER
8/10/2021. Spoke with patient.     -pelvic ultrasound done for left lower abdominal pain.     -ultrasound showed bilateral ovarian cysts.      -she states that her pain has resolved.     -she still has not had a menstrual cycle since her last COVID vaccine 2 months ago. Plan:  -she is without pain at this time.   -I recommended follow up with Dr. Robert Harvey, her gyn    She states understanding and agrees with plan.

## 2021-08-18 ENCOUNTER — TELEPHONE (OUTPATIENT)
Dept: INTERNAL MEDICINE CLINIC | Age: 41
End: 2021-08-18

## 2021-08-18 NOTE — TELEPHONE ENCOUNTER
Per [facility] patient has not read [facility] message:  Siria Naylor  I thought you had told me Dr Raul Sacks office was doing your mammogram.  He sent over his note from January and your PAP smear. The only mammograms they sent over were from 2018. I already have these. Where have you been getting your mammograms done so I can get your more recent pictures.     -Mirna Caceres with patient, she will call the imaging center and let us know when/where this was done.   She does not use [facility].

## 2021-08-18 NOTE — TELEPHONE ENCOUNTER
Renetta Luis (Self) [phone] (H)     Pt calling to let nicho know that pt contacted dr José Miguel Haddad and that was told that her last mammo date is 10/20/20 and that he will be faxing that to her today.

## 2021-08-19 DIAGNOSIS — F51.04 PSYCHOPHYSIOLOGICAL INSOMNIA: ICD-10-CM

## 2021-08-19 RX ORDER — TRAZODONE HYDROCHLORIDE 100 MG/1
50-100 TABLET ORAL
Qty: 30 TABLET | Refills: 1 | OUTPATIENT
Start: 2021-08-19

## 2021-08-23 DIAGNOSIS — F51.04 PSYCHOPHYSIOLOGICAL INSOMNIA: ICD-10-CM

## 2021-08-23 NOTE — TELEPHONE ENCOUNTER
Requested Prescriptions     Pending Prescriptions Disp Refills    traZODone (DESYREL) 100 mg tablet 30 Tablet 1     Sig: Take 0.5-1 Tablets by mouth nightly.          Requested Quantity : 90.0        [facility]/pharmacy #[id number]- [address] AT [facility] [phone]

## 2021-08-25 RX ORDER — TRAZODONE HYDROCHLORIDE 100 MG/1
50-100 TABLET ORAL
Qty: 30 TABLET | Refills: 1 | OUTPATIENT
Start: 2021-08-25

## 2021-10-13 ENCOUNTER — DOCUMENTATION ONLY (OUTPATIENT)
Dept: INTERNAL MEDICINE CLINIC | Age: 41
End: 2021-10-13

## 2021-10-19 ENCOUNTER — PATIENT MESSAGE (OUTPATIENT)
Dept: INTERNAL MEDICINE CLINIC | Age: 41
End: 2021-10-19

## 2021-10-19 DIAGNOSIS — I10 HYPERTENSION, ESSENTIAL: ICD-10-CM

## 2021-10-19 RX ORDER — HYDROCHLOROTHIAZIDE 12.5 MG/1
TABLET ORAL
Qty: 90 TABLET | Refills: 0 | Status: SHIPPED | OUTPATIENT
Start: 2021-10-19 | End: 2022-06-15

## 2021-10-27 ENCOUNTER — TRANSCRIBE ORDER (OUTPATIENT)
Dept: SCHEDULING | Age: 41
End: 2021-10-27

## 2021-10-27 DIAGNOSIS — Z12.31 SCREENING MAMMOGRAM FOR HIGH-RISK PATIENT: Primary | ICD-10-CM

## 2021-10-28 ENCOUNTER — TRANSCRIBE ORDER (OUTPATIENT)
Dept: SCHEDULING | Age: 41
End: 2021-10-28

## 2021-10-28 DIAGNOSIS — R22.32 LOCALIZED SWELLING, MASS AND LUMP, UPPER LIMB, LEFT: ICD-10-CM

## 2021-10-28 DIAGNOSIS — Z01.89 ENCOUNTER FOR OTHER SPECIFIED SPECIAL EXAMINATIONS: ICD-10-CM

## 2021-10-28 DIAGNOSIS — C50.411 MALIGNANT NEOPLASM OF UPPER-OUTER QUADRANT OF RIGHT FEMALE BREAST (HCC): Primary | ICD-10-CM

## 2021-10-28 DIAGNOSIS — N64.4 MASTODYNIA: ICD-10-CM

## 2021-11-08 ENCOUNTER — TELEPHONE (OUTPATIENT)
Dept: INTERNAL MEDICINE CLINIC | Age: 41
End: 2021-11-08

## 2021-11-08 NOTE — TELEPHONE ENCOUNTER
----- Message from Morelia Lyle sent at 11/8/2021 11:11 AM EST -----  Subject: Message to Provider    QUESTIONS  Information for Provider? Pt called in and stated did take   hydroCHLOROthiazide (HYDRODIURIL) 12.5 mg once and stated did not like the   why the pt felt and stated stop taking it and also states sleep has been   getting better but that medication did not work for the pt . And want to   let the provider know.  ---------------------------------------------------------------------------  --------------  CALL BACK INFO  What is the best way for the office to contact you? Do not leave any   message, patient will call back for answer  Preferred Call Back Phone Number? [phone]  ---------------------------------------------------------------------------  --------------  SCRIPT ANSWERS  Relationship to Patient?  Self

## 2021-11-08 NOTE — TELEPHONE ENCOUNTER
Call to patient, identified with 2 identifiers. Advised of Dr. Elver Segal' note and recommendations. Scheduled first available appointment with Dr. Elver Segal on 12/29/21. Also, scheduled NV for 11/19. She was amenable to that and will try to be better about check mychart.

## 2022-03-19 PROBLEM — F51.04 PSYCHOPHYSIOLOGICAL INSOMNIA: Status: ACTIVE | Noted: 2021-07-27

## 2022-03-19 PROBLEM — Z85.3 HISTORY OF RIGHT BREAST CANCER: Status: ACTIVE | Noted: 2018-05-22

## 2022-06-15 ENCOUNTER — OFFICE VISIT (OUTPATIENT)
Dept: INTERNAL MEDICINE CLINIC | Age: 42
End: 2022-06-15
Payer: COMMERCIAL

## 2022-06-15 VITALS
RESPIRATION RATE: 18 BRPM | TEMPERATURE: 97.3 F | BODY MASS INDEX: 24.94 KG/M2 | WEIGHT: 155.2 LBS | DIASTOLIC BLOOD PRESSURE: 84 MMHG | SYSTOLIC BLOOD PRESSURE: 117 MMHG | HEIGHT: 66 IN | HEART RATE: 102 BPM | OXYGEN SATURATION: 98 %

## 2022-06-15 DIAGNOSIS — E04.1 THYROID NODULE: Primary | ICD-10-CM

## 2022-06-15 DIAGNOSIS — I10 ESSENTIAL HYPERTENSION: ICD-10-CM

## 2022-06-15 DIAGNOSIS — F43.9 STRESS: ICD-10-CM

## 2022-06-15 PROCEDURE — 99213 OFFICE O/P EST LOW 20 MIN: CPT | Performed by: INTERNAL MEDICINE

## 2022-06-15 NOTE — ASSESSMENT & PLAN NOTE
At goal, no indication to restart HCTZ, she is taking her medication(s) as directed & without any side effects, not checking amb BP.

## 2022-06-15 NOTE — PROGRESS NOTES
Pankaj Snow is a 42 y.o. female who was seen in clinic today (6/15/2022) for an acute visit. Assessment & Plan:   Below is the assessment and plan developed based on review of pertinent history, physical exam, labs, studies, and medications. 1. Thyroid nodule  Comments:  new dx, differential reviewed, will check labs & get US report to review. Reviewed expectations & will f/u with her once these have been reviewed  Orders:  -     TSH 3RD GENERATION; Future  -     T4, FREE; Future  2. Stress  Comments:  chronic, maybe impacting her more then she thinks, reviewed life style changes, encouraged decrease alcohol intake, discussed when to consider meds/counseling  3. Essential hypertension  Assessment & Plan:  At goal, no indication to restart HCTZ, she is taking her medication(s) as directed & without any side effects, not checking amb BP. Follow-up and Dispositions    · Return if symptoms worsen or fail to improve. Subjective/Objective:   Chago Roman was seen today for Mass    Last seen by myself on 7/27/21. Started on HCTZ, she took it once, and stopped - didn't like the way it made her feel. She reports oncology found a nodule in her neck. She did have an ultrasound thru them (2 wks ago- report unavailable). It bothers her to swallow. She thinks it is impacting her voice. These are on/off issues. She has lost 20 lbs in the last year, she reports no appetite. Will have some hot flashes on/off, asking if she is starting menopause. No diarrhea or tremor. Stress level is high - school ending, work, and worried about this nodule. She is drinking ~9 drinks/wk, mostly in a social setting. Prior to Admission medications    Medication Sig Start Date End Date Taking? Authorizing Provider   traZODone (DESYREL) 100 mg tablet Take 0.5-1 Tablets by mouth nightly.  7/27/21  Yes Deanne Toussaint MD   acetaminophen (TylenoL) 325 mg tablet Take 650 mg by mouth every four (4) hours as needed for Pain. Yes Provider, Historical   amLODIPine (NORVASC) 5 mg tablet Take 5 mg by mouth daily (after breakfast). Yes Provider, Historical   hydroCHLOROthiazide (HYDRODIURIL) 12.5 mg tablet 1 tab PO daily. Appointment & labs required prior to any further refills 10/19/21 6/15/22  Ike Ness MD           Physical Exam  Neck:      Thyroid: Thyromegaly (L side more prominent then L) present. No thyroid mass or thyroid tenderness. Cardiovascular:      Rate and Rhythm: Regular rhythm. Tachycardia present. Heart sounds: No murmur heard. Lymphadenopathy:      Cervical: No cervical adenopathy. Visit Vitals  /84 (BP 1 Location: Left arm, BP Patient Position: Sitting, BP Cuff Size: Adult)   Pulse (!) 102   Temp 97.3 °F (36.3 °C) (Temporal)   Resp 18   Ht 5' 6\" (1.676 m)   Wt 155 lb 3.2 oz (70.4 kg)   SpO2 98%   BMI 25.05 kg/m²         Advised her to call back or return to office if symptoms worsen/change/persist.  Discussed expected course/resolution/complications of diagnosis in detail with patient. Medication risks/benefits/costs/interactions/alternatives discussed with patient.     Connor Dickey MD

## 2022-06-16 LAB
T4 FREE SERPL-MCNC: 1.5 NG/DL (ref 0.82–1.77)
TSH SERPL DL<=0.005 MIU/L-ACNC: 0.68 UIU/ML (ref 0.45–4.5)

## 2022-06-16 NOTE — PROGRESS NOTES
Results released to patient via [employer]. TSH lower then baseline but in the normal range. FT4 is wnl. Will monitor. Will f/u on repeat US and repeat labs at that time.

## 2022-06-17 ENCOUNTER — TELEPHONE (OUTPATIENT)
Dept: INTERNAL MEDICINE CLINIC | Age: 42
End: 2022-06-17

## 2022-06-17 NOTE — TELEPHONE ENCOUNTER
Reason for call:  Please call with lab results     Is this a new problem: yes     Date of last appointment:  6/15/2022     Can we respond via [employer]: no    Best call back number:  [phone]

## 2022-06-17 NOTE — TELEPHONE ENCOUNTER
Reason for call: Pt. Would like a call back today with her test results.    Is this a new problem: yes       Date of last appointment:  6/15/2022     Can we respond via [employer]: no    Best call back number: [phone]

## 2022-06-20 ENCOUNTER — TELEPHONE (OUTPATIENT)
Dept: INTERNAL MEDICINE CLINIC | Age: 42
End: 2022-06-20

## 2022-06-20 PROBLEM — E04.1 THYROID NODULE: Status: ACTIVE | Noted: 2022-06-20

## 2022-06-20 NOTE — TELEPHONE ENCOUNTER
Results sent via [facility] not read    Emila, all your labs look good.  The TSH (measurement of your thyroid function) is in the normal range.  The T4, free is the actually thyroid hormone, and that is also normal.  I think the plan should be to repeat the ultrasound and the labs in 4-6 months.

## 2022-06-20 NOTE — TELEPHONE ENCOUNTER
Reason for call:  Pt waiting 4 days for lab results. Got 0 update--calling to request lab results since Kavita Marie said to give a call back if she didn't hear back.     Is this a new problem: yes     Date of last appointment:  6/15/2022     Can we respond via [employer]: no    Best call back number: [phone]

## 2022-06-20 NOTE — TELEPHONE ENCOUNTER
Verified patient identity with two identifiers. Spoke with patient by phone. Advised of labs per note before. Pt states she still has sore throat and some difficulty swallowing  (infection?)  Will send to [facility] and get back to pt- also when do you want her to follow up with you again for OV.

## 2022-06-20 NOTE — TELEPHONE ENCOUNTER
----- Message from [place] sent at 6/17/2022 11:32 AM EDT -----  Subject: Results Request    QUESTIONS  Which lab or imaging result is the patient calling about? Bloodwork  Which provider ordered the test? Bg Marquez   At what location was the test performed? Date the test was performed? 2022-06-15  Additional Information for Provider? Pt would like her test results ASAP.  ---------------------------------------------------------------------------  --------------  CALL BACK INFO  What is the best way for the office to contact you? Do not leave any   message, patient will call back for answer  Preferred Call Back Phone Number? [phone]  ---------------------------------------------------------------------------  --------------  SCRIPT ANSWERS  Relationship to Patient?  Self

## 2022-06-22 NOTE — TELEPHONE ENCOUNTER
Informed patient per provider note. Patient woke up with AM and ear pain, sore throat. Aunt passed away from throat cancer. Losing weight rapidly due to not eating. COVID positive on 05/25/22. Experienced mild sx. US done by Dr. Thelma Mccord/oncology. Will request records. Patient will monitor x 5 days if not better will call for next steps. Understanding verbalized.

## 2022-06-22 NOTE — TELEPHONE ENCOUNTER
Didn't sound like infection, so I'm not sure what is going on. Thyroid is large but not enough that it should be pushing on anything or causing her trouble. I would reassure. Small bites w/ food. Avoid extreme temperatures with drinking. Update me next week if still having issues. Should follow up with me in 4-5 months. Will re-examine her, will repeat labs, and repeat US.

## 2022-12-01 NOTE — TELEPHONE ENCOUNTER
Was seen by Dr. Irma Leong, 7/15/19 , diagnosed with viral URI,  Nasal congestion, sore throat, coughing has improved. She has been using Mucinex & salt water gargles, she's going to stop the mucinex and try Day Quil for the sore throat, she also was supposed to start the flonase but has not. Advsied this was recommended per DR. Edwards. Bi-Rhombic Flap Text: The defect edges were debeveled with a #15 scalpel blade.  Given the location of the defect and the proximity to free margins a bi-rhombic flap was deemed most appropriate.  Using a sterile surgical marker, an appropriate rhombic flap was drawn incorporating the defect. The area thus outlined was incised deep to adipose tissue with a #15 scalpel blade.  The skin margins were undermined to an appropriate distance in all directions utilizing iris scissors.

## 2023-01-17 ENCOUNTER — OFFICE VISIT (OUTPATIENT)
Dept: INTERNAL MEDICINE CLINIC | Age: 43
End: 2023-01-17
Payer: COMMERCIAL

## 2023-01-17 VITALS
DIASTOLIC BLOOD PRESSURE: 102 MMHG | SYSTOLIC BLOOD PRESSURE: 157 MMHG | RESPIRATION RATE: 18 BRPM | OXYGEN SATURATION: 99 % | HEART RATE: 102 BPM | TEMPERATURE: 98.3 F | BODY MASS INDEX: 22.82 KG/M2 | HEIGHT: 66 IN | WEIGHT: 142 LBS

## 2023-01-17 DIAGNOSIS — I10 ESSENTIAL HYPERTENSION: ICD-10-CM

## 2023-01-17 DIAGNOSIS — F41.9 ANXIETY: ICD-10-CM

## 2023-01-17 DIAGNOSIS — F32.0 CURRENT MILD EPISODE OF MAJOR DEPRESSIVE DISORDER WITHOUT PRIOR EPISODE (HCC): Primary | ICD-10-CM

## 2023-01-17 DIAGNOSIS — E04.1 THYROID NODULE: ICD-10-CM

## 2023-01-17 PROCEDURE — 99214 OFFICE O/P EST MOD 30 MIN: CPT | Performed by: INTERNAL MEDICINE

## 2023-01-17 RX ORDER — SERTRALINE HYDROCHLORIDE 25 MG/1
25 TABLET, FILM COATED ORAL DAILY
Qty: 90 TABLET | Refills: 0 | Status: SHIPPED | OUTPATIENT
Start: 2023-01-17

## 2023-01-17 NOTE — PROGRESS NOTES
Yung Holt is a 36 y.o. female who was seen in clinic today (1/17/2023). Assessment & Plan:   Below is the assessment and plan developed based on review of pertinent history, physical exam, labs, studies, and medications. 1. Current mild episode of major depressive disorder without prior episode [facility])  Assessment & Plan:  Chronic issue, poorly controlled, I reviewed treatment options with her, reviewed EAP, I recommended to see a counselor, I reviewed life style changes to help improve mood, following changes were made today: will start on low dose sertraline. Reviewed ideal dosing, expectations, and side effects. Orders:  -     sertraline (ZOLOFT) 25 mg tablet; Take 1 Tablet by mouth daily. , Normal, Disp-90 Tablet, R-0  2. Anxiety  Assessment & Plan:  Chronic issue, not ideally controlled, is compounded by depression, I reviewed treatment options with her, reviewed EAP, I recommended to see a counselor, I reviewed life style changes to help improve mood, following changes were made today: will start on sertraline. She is aware this is lowest dose. Reviewed expectations & side effects. 3. Thyroid nodule  Assessment & Plan:  Asymptomatic, will check labs and repeat US. Orders:  -     TSH 3RD GENERATION; Future  -     US THYROID/PARATHYROID/SOFT TISS; Future  4. Essential hypertension  Assessment & Plan:  Previously at goal, significantly elevated today, likely mood related, will start on meds for mood, encouraged to check amb BP and update me if does not improve     Follow-up and Dispositions    Return in about 3 months (around 4/17/2023) for Regular follow up. Subjective/Objective:   Roman Hernandez was seen today for No chief complaint on file. She RTC alone. Since last visit: has noticed an increase in alcohol intake. She reports was drinking on average 3 glasses of wine/night, some nights zero and max of 4 glasses/day.   She started working at [employer] -  ([employer]).  She is balancing this with family needs. She has tried counseling. She went twice. Her biggest fear is her cancer returning. She is interested in getting help. Patient is seen for followup of thyroid nodule. She reports medication compliance: not on medication. Prior to Admission medications    Medication Sig Start Date End Date Taking? Authorizing Provider   amLODIPine (NORVASC) 5 mg tablet Take 5 mg by mouth daily (after breakfast). Yes Provider, Historical   traZODone (DESYREL) 100 mg tablet Take 0.5-1 Tablets by mouth nightly. Patient not taking: Reported on 1/17/2023 7/27/21 1/17/23  Serena Tay MD   acetaminophen (TYLENOL) 325 mg tablet Take 650 mg by mouth every four (4) hours as needed for Pain. 1/17/23  Provider, Historical          Physical Exam  Constitutional:       Comments: Well dressed and well groomed   Neck:      Thyroid: No thyroid mass, thyromegaly or thyroid tenderness. Psychiatric:      Comments: Good eye contact.        Visit Vitals  BP (!) 157/102   Pulse (!) 102   Temp 98.3 °F (36.8 °C) (Temporal)   Resp 18   Ht 5' 6\" (1.676 m)   Wt 142 lb (64.4 kg)   SpO2 99%   BMI 22.92 kg/m²       Lina Castillo MD

## 2023-01-18 LAB — TSH SERPL DL<=0.05 MIU/L-ACNC: 2.69 UIU/ML (ref 0.36–3.74)

## 2023-01-18 NOTE — ASSESSMENT & PLAN NOTE
Chronic issue, poorly controlled, I reviewed treatment options with her, reviewed EAP, I recommended to see a counselor, I reviewed life style changes to help improve mood, following changes were made today: will start on low dose sertraline. Reviewed ideal dosing, expectations, and side effects.

## 2023-01-18 NOTE — ASSESSMENT & PLAN NOTE
Previously at goal, significantly elevated today, likely mood related, will start on meds for mood, encouraged to check amb BP and update me if does not improve

## 2023-01-18 NOTE — ASSESSMENT & PLAN NOTE
Chronic issue, not ideally controlled, is compounded by depression, I reviewed treatment options with her, reviewed EAP, I recommended to see a counselor, I reviewed life style changes to help improve mood, following changes were made today: will start on sertraline. She is aware this is lowest dose. Reviewed expectations & side effects.

## 2023-02-10 ENCOUNTER — TELEPHONE (OUTPATIENT)
Dept: INTERNAL MEDICINE CLINIC | Age: 43
End: 2023-02-10

## 2023-02-10 NOTE — TELEPHONE ENCOUNTER
Reason for call:  Spoke with pt. She was at our office couple weeks ago per pt. She has some health issue that would like to inform Dr. She did not provide me with any other information.    Pt  requested a call back    Is this a new problem: yes     Date of last appointment:  1/17/2023     Can we respond via [employer]: no    Best call back number: [facility], [address] [place] [address]

## 2023-02-15 ENCOUNTER — TELEPHONE (OUTPATIENT)
Dept: INTERNAL MEDICINE CLINIC | Age: 43
End: 2023-02-15

## 2023-02-15 DIAGNOSIS — F41.9 ANXIETY: Primary | ICD-10-CM

## 2023-02-15 RX ORDER — ESCITALOPRAM OXALATE 10 MG/1
TABLET ORAL
Qty: 30 TABLET | Refills: 0 | Status: SHIPPED | OUTPATIENT
Start: 2023-02-15

## 2023-02-15 NOTE — TELEPHONE ENCOUNTER
Notified patient per MD note. Understanding verbalized. Patient in agreement with starting medication.  Medication sent

## 2023-02-15 NOTE — TELEPHONE ENCOUNTER
Reason for call:  TC from pt - pt states she is returning Debra's call.     Is this a new problem: no     Date of last appointment:  1/17/2023     Can we respond via [employer]: no    Best call back number: [phone]

## 2023-02-15 NOTE — TELEPHONE ENCOUNTER
This is the lowest dose. I would stop it. I would try one more SSRI, if side effects w/ this I would not try another one and switch medications. Lexapro 10mg. 1/2 tab daily x 10 days and then increase to 1 tab daily if tolerating well. #30, RF 0.

## 2023-02-15 NOTE — TELEPHONE ENCOUNTER
Patient report Zoloft side effects headaches, nausea, dizziness. Started to interfere with job, not completely alert. Patient stopped medication after three days. Since stopping medication sx have subsided. Patient asking if another medication or lower dose can be prescribed. feel that medication could be too strong.

## 2023-02-24 ENCOUNTER — TELEPHONE (OUTPATIENT)
Dept: INTERNAL MEDICINE CLINIC | Age: 43
End: 2023-02-24

## 2023-03-15 ENCOUNTER — HOSPITAL ENCOUNTER (OUTPATIENT)
Dept: ULTRASOUND IMAGING | Age: 43
Discharge: HOME | End: 2023-03-15
Attending: INTERNAL MEDICINE
Payer: COMMERCIAL

## 2023-03-15 DIAGNOSIS — E04.1 THYROID NODULE: ICD-10-CM

## 2023-03-15 PROCEDURE — 76536 US EXAM OF HEAD AND NECK: CPT

## 2023-03-17 DIAGNOSIS — F41.9 ANXIETY: ICD-10-CM

## 2023-03-17 RX ORDER — ESCITALOPRAM OXALATE 10 MG/1
10 TABLET ORAL DAILY
Qty: 30 TABLET | Refills: 0 | Status: SHIPPED | OUTPATIENT
Start: 2023-03-17

## 2023-03-18 NOTE — PROGRESS NOTES
Results reviewed. Previous US (6/6/22) showed a L nodule: 0.8 x 1.4 x 1.8cm. L isthmus nodule is 0.5 x 0.9 x 1.1cm. L nodule stable, unclear if other one is L or isthmus.

## 2023-05-24 RX ORDER — AMLODIPINE BESYLATE 5 MG/1
5 TABLET ORAL
COMMUNITY

## 2023-05-24 RX ORDER — ESCITALOPRAM OXALATE 10 MG/1
10 TABLET ORAL DAILY
COMMUNITY
Start: 2023-03-17

## 2023-09-08 ENCOUNTER — CLINICAL DOCUMENTATION (OUTPATIENT)
Age: 43
End: 2023-09-08

## 2023-09-08 ENCOUNTER — TELEPHONE (OUTPATIENT)
Age: 43
End: 2023-09-08

## 2023-09-08 ENCOUNTER — OFFICE VISIT (OUTPATIENT)
Age: 43
End: 2023-09-08
Payer: MEDICARE

## 2023-09-08 VITALS
SYSTOLIC BLOOD PRESSURE: 142 MMHG | HEART RATE: 122 BPM | OXYGEN SATURATION: 100 % | RESPIRATION RATE: 16 BRPM | DIASTOLIC BLOOD PRESSURE: 103 MMHG | BODY MASS INDEX: 21.92 KG/M2 | TEMPERATURE: 97.8 F | WEIGHT: 136.4 LBS | HEIGHT: 66 IN

## 2023-09-08 DIAGNOSIS — N63.20 PAINFUL LUMPY LEFT BREAST: ICD-10-CM

## 2023-09-08 DIAGNOSIS — N64.4 PAINFUL LUMPY LEFT BREAST: ICD-10-CM

## 2023-09-08 DIAGNOSIS — G25.2 FINE TREMOR: Primary | ICD-10-CM

## 2023-09-08 PROCEDURE — 99213 OFFICE O/P EST LOW 20 MIN: CPT | Performed by: NURSE PRACTITIONER

## 2023-09-08 PROCEDURE — 3077F SYST BP >= 140 MM HG: CPT | Performed by: NURSE PRACTITIONER

## 2023-09-08 PROCEDURE — 3080F DIAST BP >= 90 MM HG: CPT | Performed by: NURSE PRACTITIONER

## 2023-09-08 NOTE — PROGRESS NOTES
Left message for patient to call me back to schedule appointment with Dr. Danita Roberts.  Per Susana Oden

## 2023-09-08 NOTE — TELEPHONE ENCOUNTER
Spoke with clinical supervisor at [facility]. The patient has a new LEFT breast mass. They tried to get patient in for diagnostic mammogram but cannot get her in for a month at [facility]. Asked if we could get the patient in early next week. Advised Dr. Scarlett Rose is booked but can have PSR call patient for an appointment with one of her colleagues. She will let the patient know someone from our office will call for an appointment and was very appreciative.

## 2023-09-11 ENCOUNTER — OFFICE VISIT (OUTPATIENT)
Age: 43
End: 2023-09-11

## 2023-09-11 VITALS — HEIGHT: 66 IN | WEIGHT: 136 LBS | BODY MASS INDEX: 21.86 KG/M2

## 2023-09-11 DIAGNOSIS — Z85.3 HISTORY OF RIGHT BREAST CANCER: Primary | ICD-10-CM

## 2023-09-11 NOTE — PROGRESS NOTES
HISTORY OF PRESENT ILLNESS  Nataly Mohan is a 36 y.o. female     HPI ESTABLISHED Patient here for LEFT breast pain. Physician felt a lump near the a scar on that breast. Has been feeling pain that comes and goes for about three weeks. LOV 04/2021- LEFT  breast pain, MRI recommended but Pt did not want at the time. 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 5/22/18 - RIGHT mastectomy, SNbx, with reconstruction, TE - pathology showed IDC 8 mm and DCIS, ER 0%, UT 0%, HER2 3+   Separate nodule 8 mm resected separately, not true + margin. LEFT breast Mammogram at [facility], in the fall of 2020. She was told it was fine. Review of Systems   All other systems reviewed and are negative. Physical Exam  Vitals and nursing note reviewed. Chest:   Breasts:     Left: No swelling, bleeding, inverted nipple, mass, nipple discharge, skin change or tenderness. Lymphadenopathy:      Upper Body:      Left upper body: No axillary adenopathy. ASSESSMENT and PLAN   Diagnosis Orders   1. History of right breast cancer  [facility]ITA DIGITAL DIAGNOSTIC UNILATERAL LEFT      - needs mammogram overdue  - likely normal hormonal breast pain  20 minutes was spent with patient on counseling and coordination of care.

## 2023-09-26 ENCOUNTER — HOSPITAL ENCOUNTER (OUTPATIENT)
Facility: HOSPITAL | Age: 43
Discharge: HOME | End: 2023-09-29
Attending: SURGERY
Payer: MEDICARE

## 2023-09-26 VITALS — WEIGHT: 140 LBS | BODY MASS INDEX: 22.5 KG/M2 | HEIGHT: 66 IN

## 2023-09-26 DIAGNOSIS — Z85.3 HISTORY OF RIGHT BREAST CANCER: ICD-10-CM

## 2023-09-26 PROCEDURE — 77063 BREAST TOMOSYNTHESIS BI: CPT

## 2023-09-27 ENCOUNTER — TELEPHONE (OUTPATIENT)
Age: 43
End: 2023-09-27

## 2023-09-27 NOTE — TELEPHONE ENCOUNTER
Patient left a message asking for the results of her mammogram yesterday. Results in chart say negative. Patient was happy to hear that.

## 2024-01-30 ENCOUNTER — OFFICE VISIT (OUTPATIENT)
Age: 44
End: 2024-01-30
Payer: COMMERCIAL

## 2024-01-30 VITALS
WEIGHT: 145.4 LBS | HEART RATE: 89 BPM | OXYGEN SATURATION: 99 % | SYSTOLIC BLOOD PRESSURE: 138 MMHG | HEIGHT: 66 IN | RESPIRATION RATE: 16 BRPM | DIASTOLIC BLOOD PRESSURE: 84 MMHG | BODY MASS INDEX: 23.37 KG/M2 | TEMPERATURE: 98.3 F

## 2024-01-30 DIAGNOSIS — F32.5 MAJOR DEPRESSIVE DISORDER WITH SINGLE EPISODE, IN FULL REMISSION (HCC): ICD-10-CM

## 2024-01-30 DIAGNOSIS — Z85.3 HISTORY OF RIGHT BREAST CANCER: ICD-10-CM

## 2024-01-30 DIAGNOSIS — F41.9 ANXIETY: ICD-10-CM

## 2024-01-30 DIAGNOSIS — Z00.00 ROUTINE PHYSICAL EXAMINATION: Primary | ICD-10-CM

## 2024-01-30 DIAGNOSIS — I10 ESSENTIAL HYPERTENSION: ICD-10-CM

## 2024-01-30 PROBLEM — F32.9 MAJOR DEPRESSIVE DISORDER, SINGLE EPISODE, UNSPECIFIED: Status: ACTIVE | Noted: 2023-01-17

## 2024-01-30 PROCEDURE — 3079F DIAST BP 80-89 MM HG: CPT | Performed by: INTERNAL MEDICINE

## 2024-01-30 PROCEDURE — 99396 PREV VISIT EST AGE 40-64: CPT | Performed by: INTERNAL MEDICINE

## 2024-01-30 PROCEDURE — 99214 OFFICE O/P EST MOD 30 MIN: CPT | Performed by: INTERNAL MEDICINE

## 2024-01-30 PROCEDURE — 3075F SYST BP GE 130 - 139MM HG: CPT | Performed by: INTERNAL MEDICINE

## 2024-01-30 SDOH — ECONOMIC STABILITY: HOUSING INSECURITY
IN THE LAST 12 MONTHS, WAS THERE A TIME WHEN YOU DID NOT HAVE A STEADY PLACE TO SLEEP OR SLEPT IN A SHELTER (INCLUDING NOW)?: NO

## 2024-01-30 SDOH — ECONOMIC STABILITY: FOOD INSECURITY: WITHIN THE PAST 12 MONTHS, YOU WORRIED THAT YOUR FOOD WOULD RUN OUT BEFORE YOU GOT MONEY TO BUY MORE.: NEVER TRUE

## 2024-01-30 SDOH — ECONOMIC STABILITY: FOOD INSECURITY: WITHIN THE PAST 12 MONTHS, THE FOOD YOU BOUGHT JUST DIDN'T LAST AND YOU DIDN'T HAVE MONEY TO GET MORE.: NEVER TRUE

## 2024-01-30 SDOH — ECONOMIC STABILITY: INCOME INSECURITY: HOW HARD IS IT FOR YOU TO PAY FOR THE VERY BASICS LIKE FOOD, HOUSING, MEDICAL CARE, AND HEATING?: NOT HARD AT ALL

## 2024-01-30 ASSESSMENT — PATIENT HEALTH QUESTIONNAIRE - PHQ9
5. POOR APPETITE OR OVEREATING: 3
2. FEELING DOWN, DEPRESSED OR HOPELESS: 0
7. TROUBLE CONCENTRATING ON THINGS, SUCH AS READING THE NEWSPAPER OR WATCHING TELEVISION: 0
1. LITTLE INTEREST OR PLEASURE IN DOING THINGS: 0
SUM OF ALL RESPONSES TO PHQ QUESTIONS 1-9: 5
4. FEELING TIRED OR HAVING LITTLE ENERGY: 1
3. TROUBLE FALLING OR STAYING ASLEEP: 1
SUM OF ALL RESPONSES TO PHQ9 QUESTIONS 1 & 2: 0
6. FEELING BAD ABOUT YOURSELF - OR THAT YOU ARE A FAILURE OR HAVE LET YOURSELF OR YOUR FAMILY DOWN: 0
9. THOUGHTS THAT YOU WOULD BE BETTER OFF DEAD, OR OF HURTING YOURSELF: 0
SUM OF ALL RESPONSES TO PHQ QUESTIONS 1-9: 5
SUM OF ALL RESPONSES TO PHQ QUESTIONS 1-9: 5
10. IF YOU CHECKED OFF ANY PROBLEMS, HOW DIFFICULT HAVE THESE PROBLEMS MADE IT FOR YOU TO DO YOUR WORK, TAKE CARE OF THINGS AT HOME, OR GET ALONG WITH OTHER PEOPLE: 1
SUM OF ALL RESPONSES TO PHQ QUESTIONS 1-9: 5
8. MOVING OR SPEAKING SO SLOWLY THAT OTHER PEOPLE COULD HAVE NOTICED. OR THE OPPOSITE, BEING SO FIGETY OR RESTLESS THAT YOU HAVE BEEN MOVING AROUND A LOT MORE THAN USUAL: 0

## 2024-01-30 ASSESSMENT — LIFESTYLE VARIABLES
HOW MANY STANDARD DRINKS CONTAINING ALCOHOL DO YOU HAVE ON A TYPICAL DAY: 1 OR 2
HOW OFTEN DO YOU HAVE A DRINK CONTAINING ALCOHOL: 4 OR MORE TIMES A WEEK

## 2024-01-30 ASSESSMENT — ENCOUNTER SYMPTOMS
ABDOMINAL PAIN: 0
NAUSEA: 0
SHORTNESS OF BREATH: 0
COUGH: 0
BLOOD IN STOOL: 0
VOMITING: 0
DIARRHEA: 0
CONSTIPATION: 0

## 2024-01-30 NOTE — PROGRESS NOTES
Radha Mcdowell is a 43 y.o. female who was seen in clinic today (1/30/2024) for a full physical.        Assessment & Plan:   Below is the assessment and plan developed based on review of pertinent history, physical exam, labs, studies, and medications.    1. Routine physical examination  Comments:  Insurance form compoleted  Orders:  -     Comprehensive Metabolic Panel; Future  -     CBC; Future  -     Lipid Panel; Future  -     TSH; Future  2. History of right breast cancer  Assessment & Plan:  5+ yrs from diagnosis, up to date with imaging, will defer to specialist   3. Essential hypertension  Assessment & Plan:  Just at goal, improved at home, continue current treatment    4. Major depressive disorder with single episode, in full remission (HCC)  Assessment & Plan:  Improved and well controlled, I reviewed treatment options with her, I reviewed life style changes to help improve mood,  no indication to restart meds at this time.    5. Anxiety  Assessment & Plan:  Improved and well controlled, I reviewed treatment options with her, I reviewed life style changes to help improve mood,  no indication to restart medications at this time.        Return in about 1 year (around 1/30/2025) for FULL PHYSICAL.   Subjective:   Radha is here today for a full physical.  She initially called and scheduled an appointment for a cough but reports this resolved and wanted a physical and insurance form completed.  Since last visit: weight is stable    Health Maintenance  Immunizations:   Covid: declined  Influenza: declined   Tetanus: up to date    Cancer screening:   Cervical: reviewed guidelines, up to date  Breast: reviewed guidelines, up to date.   Colon: due this July, reviewed guidelines      The following acute and/or chronic problems were addressed today:    Cardiovascular Review  The patient has hypertension.  She reports taking medications as instructed, no medication side effects noted, home BP monitoring in range of

## 2024-01-31 DIAGNOSIS — Z00.00 ROUTINE PHYSICAL EXAMINATION: ICD-10-CM

## 2024-01-31 NOTE — ASSESSMENT & PLAN NOTE
Improved and well controlled, I reviewed treatment options with her, I reviewed life style changes to help improve mood,  no indication to restart meds at this time.

## 2024-01-31 NOTE — ASSESSMENT & PLAN NOTE
Improved and well controlled, I reviewed treatment options with her, I reviewed life style changes to help improve mood,  no indication to restart medications at this time.

## 2024-02-01 ENCOUNTER — TELEPHONE (OUTPATIENT)
Age: 44
End: 2024-02-01

## 2024-02-01 DIAGNOSIS — R79.89 ELEVATED LFTS: Primary | ICD-10-CM

## 2024-02-01 LAB
ALBUMIN SERPL-MCNC: 3.7 G/DL (ref 3.5–5)
ALBUMIN/GLOB SERPL: 0.9 (ref 1.1–2.2)
ALP SERPL-CCNC: 201 U/L (ref 45–117)
ALT SERPL-CCNC: 50 U/L (ref 12–78)
ANION GAP SERPL CALC-SCNC: 9 MMOL/L (ref 5–15)
AST SERPL-CCNC: 153 U/L (ref 15–37)
BILIRUB SERPL-MCNC: 1.2 MG/DL (ref 0.2–1)
BUN SERPL-MCNC: 6 MG/DL (ref 6–20)
BUN/CREAT SERPL: 11 (ref 12–20)
CALCIUM SERPL-MCNC: 9.5 MG/DL (ref 8.5–10.1)
CHLORIDE SERPL-SCNC: 100 MMOL/L (ref 97–108)
CHOLEST SERPL-MCNC: 243 MG/DL
CO2 SERPL-SCNC: 26 MMOL/L (ref 21–32)
CREAT SERPL-MCNC: 0.56 MG/DL (ref 0.55–1.02)
ERYTHROCYTE [DISTWIDTH] IN BLOOD BY AUTOMATED COUNT: 14.1 % (ref 11.5–14.5)
GLOBULIN SER CALC-MCNC: 4.1 G/DL (ref 2–4)
GLUCOSE SERPL-MCNC: 103 MG/DL (ref 65–100)
HCT VFR BLD AUTO: 39.6 % (ref 35–47)
HDLC SERPL-MCNC: 124 MG/DL
HDLC SERPL: 2 (ref 0–5)
HGB BLD-MCNC: 13.8 G/DL (ref 11.5–16)
LDLC SERPL CALC-MCNC: 102.8 MG/DL (ref 0–100)
MCH RBC QN AUTO: 36.2 PG (ref 26–34)
MCHC RBC AUTO-ENTMCNC: 34.8 G/DL (ref 30–36.5)
MCV RBC AUTO: 103.9 FL (ref 80–99)
NRBC # BLD: 0 K/UL (ref 0–0.01)
NRBC BLD-RTO: 0 PER 100 WBC
PLATELET # BLD AUTO: 170 K/UL (ref 150–400)
PMV BLD AUTO: 10.3 FL (ref 8.9–12.9)
POTASSIUM SERPL-SCNC: 3.9 MMOL/L (ref 3.5–5.1)
PROT SERPL-MCNC: 7.8 G/DL (ref 6.4–8.2)
RBC # BLD AUTO: 3.81 M/UL (ref 3.8–5.2)
SODIUM SERPL-SCNC: 135 MMOL/L (ref 136–145)
TRIGL SERPL-MCNC: 81 MG/DL
TSH SERPL DL<=0.05 MIU/L-ACNC: 1.34 UIU/ML (ref 0.36–3.74)
VLDLC SERPL CALC-MCNC: 16.2 MG/DL
WBC # BLD AUTO: 5.6 K/UL (ref 3.6–11)

## 2024-02-01 NOTE — TELEPHONE ENCOUNTER
RC to pt. Pt declined having US done at this time d/t cost of medical bills. Pt stated she has decreased alcohol intake, and will repeat labs in 1 month.

## 2024-02-01 NOTE — RESULT ENCOUNTER NOTE
Results released to patient via [employer].  All labs are stable or at goal for her, except for low Na and abnormal LFT's (bili, AST and Alk Phos).  She has had elevated LFT's in the past, but not like this  Ratio suggest alcohol induced damage.  She has a h/o excessive alcohol use but reports reduced intake.  Please verify she did cut down on alcohol intake.  Has a h/o breast cancer.  I do not think this is related but I would like to order an US and repeat labs in 1 month.

## 2024-02-01 NOTE — TELEPHONE ENCOUNTER
Patient would like to speak with Arlene again regarding her lab results.    Radha Mcdowell - [phone]

## 2024-02-01 NOTE — TELEPHONE ENCOUNTER
Reason for call:  TC from Renee Mcdowell, .  on [employer]. Mr. Mcdowell states pt received a call re her lab results and that she needed to have a ultrasound done. Mr. Mcdowell stated information made pt very upset and wanted  to call office to get clarification.     Is this a new problem: Yes    Date of last appointment:  1/30/2024     Can we respond via [employer]: No    Best call back number: Renee Mcdowell/Phone Number [phone]

## 2024-02-07 ENCOUNTER — TELEPHONE (OUTPATIENT)
Age: 44
End: 2024-02-07

## 2024-02-07 NOTE — TELEPHONE ENCOUNTER
Called pt ID x 2, reviewed recent lab results. Pt verbalized understanding, stated she would have repeat labs done around 3/1/24, depending on results, will then discuss having US as recommended by PCP.

## 2024-02-22 ENCOUNTER — HOSPITAL ENCOUNTER (OUTPATIENT)
Facility: HOSPITAL | Age: 44
Discharge: HOME | End: 2024-02-22
Attending: INTERNAL MEDICINE
Payer: COMMERCIAL

## 2024-02-22 DIAGNOSIS — R79.89 ELEVATED LFTS: ICD-10-CM

## 2024-02-22 PROCEDURE — 76705 ECHO EXAM OF ABDOMEN: CPT

## 2024-02-23 ENCOUNTER — TELEPHONE (OUTPATIENT)
Age: 44
End: 2024-02-23

## 2024-02-23 NOTE — TELEPHONE ENCOUNTER
Called pt, ID x 2. Advised per 's note regarding recent fall, US results and his recommendations. Pt verbalized understanding, Offered appt with  for today, as NP does not have any openings. Pt declined appt, requested to see NP next week. Scheduled appt for 2/27/24 at 8am with Phill Dorantes. Advised pt to go to ER if the seizure activity happens again. Pt verbalized understanding.

## 2024-02-23 NOTE — TELEPHONE ENCOUNTER
Seizure like activity could have been due to stopping EtOH but I would have expected it to have occurred around day 2-3.  The fact that she feels good and VS were okay makes this less likely, but we can't rule anything out.    She should be evaluated, can wait to next week.  If she can't see me then should see Phill. If happens again then to ER.  Continue to avoid EtOH.  The medication that makes someone sick when they drink is not recommended as people just don't take it when they want to drink.    US - shows fatty liver, no masses.  We were going to repeat her liver tests but she can do that when she is in next week.

## 2024-02-23 NOTE — TELEPHONE ENCOUNTER
Called pt, ID x 2. Pt and spouse on speaker phone requesting 's advice. Pt reported she had abd US yesterday, and \"felt a little dizzy afterwords\" then last night as she was putting her child to bed, she fell. Pt's spouse stated pt was laying on the floor having a seizure, \"her body was jerking and twitching.\" EMS arrived shortly after pt \"came to\" to transport to ED. Pt's spouse stated pt refused to go to ED. EMS took vitals, and checked blood sugar, which was 120, and explained to them she could have had a seizure d/t stopping ETOH \"cold turkey\" per pt's spouse. Pt reported she has not had any alcohol 5 days, and is \"feeling okay this am.\" Pt reported BP was 117/79,  this morning. Pt requested 's advice as to whether she should take \"the pill\" that would make her sick if she had any alcohol. Offered to assist pt in scheduling an appt for today, pt declined, as she \"only wants to see \" and he does not have any openings. Pt also requested US results. Will forward to PCP for review.

## 2024-02-23 NOTE — TELEPHONE ENCOUNTER
Reason for call:  TC from pt's , Renee Mcdowell.  on PHI. Mr. Mcdowell stated that pt went to have UA done, yesterday,  that Dr. Gonzáles requested. Mr. Mcdowell stated around 9:00 p.m. pt fell/fainted and started, what thought to be, a seizure. Mr. Mcdowell stated he called EMT and when they came out, pt had come out of what had happened and the EMT's requested that they call pt's PCP first thing in the morning to report what happened to pt. Mr. Mcdowell states he is calling to see what pt/they should do and what next steps are for pt. Mr. Mcdowell stated that Dr. Gonzáles is very aware of pt's conditions and would really like to speak with Dr. Gonzáles if at all possible because this is urgent. Mr. Mcdowell will also speak with nurse, if nurse can please call him at phone number: [phone].    Is this a new problem: Yes    Date of last appointment:  1/30/2024     Can we respond via [employer]: No    Best call back number: Renee Mcdowell/Phone Number [phone]

## 2024-02-27 ENCOUNTER — OFFICE VISIT (OUTPATIENT)
Age: 44
End: 2024-02-27
Payer: COMMERCIAL

## 2024-02-27 VITALS
BODY MASS INDEX: 24.08 KG/M2 | RESPIRATION RATE: 16 BRPM | TEMPERATURE: 97.8 F | HEART RATE: 85 BPM | OXYGEN SATURATION: 98 % | WEIGHT: 149.8 LBS | DIASTOLIC BLOOD PRESSURE: 81 MMHG | HEIGHT: 66 IN | SYSTOLIC BLOOD PRESSURE: 116 MMHG

## 2024-02-27 DIAGNOSIS — R74.8 ELEVATED LIVER ENZYMES: Primary | ICD-10-CM

## 2024-02-27 DIAGNOSIS — R55 SYNCOPE, UNSPECIFIED SYNCOPE TYPE: ICD-10-CM

## 2024-02-27 DIAGNOSIS — R79.89 ELEVATED LFTS: ICD-10-CM

## 2024-02-27 DIAGNOSIS — S40.022A TRAUMATIC ECCHYMOSIS OF LEFT UPPER ARM, INITIAL ENCOUNTER: ICD-10-CM

## 2024-02-27 LAB
ALBUMIN SERPL-MCNC: 3.7 G/DL (ref 3.5–5)
ALBUMIN/GLOB SERPL: 1.1 (ref 1.1–2.2)
ALP SERPL-CCNC: 175 U/L (ref 45–117)
ALT SERPL-CCNC: 50 U/L (ref 12–78)
ANION GAP SERPL CALC-SCNC: 4 MMOL/L (ref 5–15)
APPEARANCE UR: ABNORMAL
AST SERPL-CCNC: 94 U/L (ref 15–37)
BACTERIA URNS QL MICRO: ABNORMAL /HPF
BILIRUB DIRECT SERPL-MCNC: 0.3 MG/DL (ref 0–0.2)
BILIRUB SERPL-MCNC: 0.7 MG/DL (ref 0.2–1)
BILIRUB UR QL: NEGATIVE
BUN SERPL-MCNC: 5 MG/DL (ref 6–20)
BUN/CREAT SERPL: 8 (ref 12–20)
CALCIUM SERPL-MCNC: 9.6 MG/DL (ref 8.5–10.1)
CHLORIDE SERPL-SCNC: 103 MMOL/L (ref 97–108)
CO2 SERPL-SCNC: 28 MMOL/L (ref 21–32)
COLOR UR: ABNORMAL
CREAT SERPL-MCNC: 0.61 MG/DL (ref 0.55–1.02)
EPITH CASTS URNS QL MICRO: ABNORMAL /LPF
GLOBULIN SER CALC-MCNC: 3.5 G/DL (ref 2–4)
GLUCOSE SERPL-MCNC: 93 MG/DL (ref 65–100)
GLUCOSE UR STRIP.AUTO-MCNC: NEGATIVE MG/DL
HGB UR QL STRIP: NEGATIVE
KETONES UR QL STRIP.AUTO: NEGATIVE MG/DL
LEUKOCYTE ESTERASE UR QL STRIP.AUTO: ABNORMAL
NITRITE UR QL STRIP.AUTO: POSITIVE
PH UR STRIP: 6 (ref 5–8)
POTASSIUM SERPL-SCNC: 4.4 MMOL/L (ref 3.5–5.1)
PROT SERPL-MCNC: 7.2 G/DL (ref 6.4–8.2)
PROT UR STRIP-MCNC: NEGATIVE MG/DL
RBC #/AREA URNS HPF: ABNORMAL /HPF (ref 0–5)
SODIUM SERPL-SCNC: 135 MMOL/L (ref 136–145)
SP GR UR REFRACTOMETRY: 1.02 (ref 1–1.03)
URINE CULTURE IF INDICATED: ABNORMAL
UROBILINOGEN UR QL STRIP.AUTO: 1 EU/DL (ref 0.2–1)
WBC URNS QL MICRO: ABNORMAL /HPF (ref 0–4)

## 2024-02-27 PROCEDURE — 99213 OFFICE O/P EST LOW 20 MIN: CPT | Performed by: NURSE PRACTITIONER

## 2024-02-27 PROCEDURE — 3079F DIAST BP 80-89 MM HG: CPT | Performed by: NURSE PRACTITIONER

## 2024-02-27 PROCEDURE — 3074F SYST BP LT 130 MM HG: CPT | Performed by: NURSE PRACTITIONER

## 2024-02-27 ASSESSMENT — PATIENT HEALTH QUESTIONNAIRE - PHQ9
2. FEELING DOWN, DEPRESSED OR HOPELESS: 0
SUM OF ALL RESPONSES TO PHQ QUESTIONS 1-9: 0
1. LITTLE INTEREST OR PLEASURE IN DOING THINGS: 0
SUM OF ALL RESPONSES TO PHQ QUESTIONS 1-9: 0
SUM OF ALL RESPONSES TO PHQ9 QUESTIONS 1 & 2: 0

## 2024-02-27 ASSESSMENT — ENCOUNTER SYMPTOMS: ROS SKIN COMMENTS: BRUISING

## 2024-02-27 NOTE — PROGRESS NOTES
HPI:  Radha Mcdowell (:  1980) is a 43 y.o. female,here for evaluation of the following chief complaint(s):  Follow-up (Fall last Thursday )    /81   Pulse 85   Temp 97.8 °F (36.6 °C) (Temporal)   Resp 16   Ht 1.676 m (5' 6\")   Wt 67.9 kg (149 lb 12.8 oz)   SpO2 98%   BMI 24.18 kg/m²       SUBJECTIVE/OBJECTIVE:    HPI:Patient had syncopal episode around 9 pm on 24. She was already sitting on the floor reaching up to turn off a lamp when the incident happened. She doesn't believe she hit her head. Her  heard the collapse and came upstairs. He found her on the floor and her whole body was shaking all over for about 1 minute. Her mother threw cold water on her and she came to almost immediately. EMS came and heart rate was in the 160s. BP was elevated. Blood sugar was around 120. Her blood pressure is 116/80s. She did sustain large bruise of left arm during the fall. She stopped amlodipine 5 days ago due to low blood pressure readings. She quit drinking alcohol 2 weeks ago cold turkey. She was drinking wine or liquor daily at least 2-6 drinks.       Review of Systems   Skin:         bruising       Physical Exam  Constitutional:       Appearance: Normal appearance.   Cardiovascular:      Rate and Rhythm: Normal rate and regular rhythm.   Pulmonary:      Effort: Pulmonary effort is normal.      Breath sounds: Normal breath sounds.   Musculoskeletal:      Cervical back: Normal range of motion.   Skin:     Comments: Bruising of left arm, mild pain and swelling of left upper arm and elbow   Neurological:      General: No focal deficit present.      Mental Status: She is alert and oriented to person, place, and time.   Psychiatric:         Mood and Affect: Mood normal.         Behavior: Behavior normal.         ASSESSMENT/PLAN:  1. Elevated liver enzymes  2. Syncope, unspecified syncope type  -     Urinalysis with Reflex to Culture; Future  -     Basic Metabolic Panel; Future  3. Traumatic

## 2024-02-28 NOTE — RESULT ENCOUNTER NOTE
Results released to patient via [employer].  AST and bili improved but not back to baseline. ALT stable.  US w/o obvious abnormalities.  Will repeat in another month.

## 2024-02-29 LAB
BACTERIA SPEC CULT: ABNORMAL
CC UR VC: ABNORMAL
SERVICE CMNT-IMP: ABNORMAL

## 2024-02-29 RX ORDER — NITROFURANTOIN 25; 75 MG/1; MG/1
100 CAPSULE ORAL 2 TIMES DAILY
Qty: 10 CAPSULE | Refills: 0 | Status: SHIPPED | OUTPATIENT
Start: 2024-02-29 | End: 2024-03-05

## 2024-06-18 ENCOUNTER — HOSPITAL ENCOUNTER (EMERGENCY)
Facility: HOSPITAL | Age: 44
Discharge: HOME | End: 2024-06-18
Attending: EMERGENCY MEDICINE
Payer: COMMERCIAL

## 2024-06-18 VITALS
TEMPERATURE: 97.4 F | DIASTOLIC BLOOD PRESSURE: 104 MMHG | OXYGEN SATURATION: 92 % | WEIGHT: 167.11 LBS | HEART RATE: 93 BPM | SYSTOLIC BLOOD PRESSURE: 154 MMHG | RESPIRATION RATE: 14 BRPM | HEIGHT: 66 IN | BODY MASS INDEX: 26.86 KG/M2

## 2024-06-18 DIAGNOSIS — S01.81XA FACIAL LACERATION, INITIAL ENCOUNTER: Primary | ICD-10-CM

## 2024-06-18 PROCEDURE — 99283 EMERGENCY DEPT VISIT LOW MDM: CPT

## 2024-06-18 PROCEDURE — 12013 RPR F/E/E/N/L/M 2.6-5.0 CM: CPT

## 2024-06-18 RX ORDER — LIDOCAINE HYDROCHLORIDE AND EPINEPHRINE 10; 10 MG/ML; UG/ML
10 INJECTION, SOLUTION INFILTRATION; PERINEURAL ONCE
Status: DISCONTINUED | OUTPATIENT
Start: 2024-06-18 | End: 2024-06-19 | Stop reason: HOSPADM

## 2024-06-18 RX ORDER — GINSENG 100 MG
CAPSULE ORAL
Status: DISCONTINUED | OUTPATIENT
Start: 2024-06-18 | End: 2024-06-19 | Stop reason: HOSPADM

## 2024-06-19 NOTE — DISCHARGE INSTRUCTIONS
You were seen in the emergency department for a laceration to the right side of your forehead after a fall.  Given the significant bruising around your eye, you are offered a CT scan to look for bleeding or broken bones but declined.  Please take Tylenol at home for pain.  Please follow-up with your PCP or return to the emergency department to have your sutures taken out in 7 days or if you experience a worsening of symptoms or any new symptoms that are concerning to you.

## 2024-06-19 NOTE — ED TRIAGE NOTES
Patient arrived from home via EMS. Per EMP patient had a fall a few hours ago, laceration on the right eyebrow, no LOC, no blood thinners. Patient reports fell in a shower, slipped. Patient is A/O in Triage, reports no pain. Bleeding controlled prior to arrival.

## 2024-06-19 NOTE — ED NOTES
Condition Stable  Patient discharged to home  Patient education was completed  Education taught to patient and spouse  Teaching method used was handout and verbal  Understanding of teaching was good  Patient was discharged ambulatory with spouse  Discharged with spouse  Valuables were given to patient/spouse remained in possession of belongings during stay    - Patient and spouse educated on wound care and s/s of infection. Patient and spouse educated on to have sutures removed in 7 days. Understanding verbalized.

## 2024-06-19 NOTE — ED NOTES
Lac Repair    Date/Time: 6/18/2024 11:16 PM    Performed by: Pio Gillespie MD  Authorized by: Damián Kirkpatrick MD    Consent:     Consent obtained:  Verbal    Consent given by:  Patient    Risks, benefits, and alternatives were discussed: yes      Risks discussed:  Infection, pain, poor cosmetic result, need for additional repair, nerve damage and poor wound healing    Alternatives discussed:  Delayed treatment  Universal protocol:     Procedure explained and questions answered to patient or proxy's satisfaction: yes      Relevant documents present and verified: yes      Site/side marked: yes      Immediately prior to procedure, a time out was called: yes      Patient identity confirmed:  Verbally with patient and arm band  Anesthesia:     Anesthesia method:  Local infiltration    Local anesthetic:  Lidocaine 1% WITH epi  Laceration details:     Location:  Face    Face location:  R eyebrow    Length (cm):  5  Exploration:     Hemostasis achieved with:  Direct pressure    Wound extent: no foreign bodies/material noted, no nerve damage noted and no vascular damage noted      Contaminated: no    Treatment:     Area cleansed with:  Saline and Shur-Clens    Amount of cleaning:  Standard    Irrigation solution:  Tap water    Irrigation method:  Syringe    Debridement:  None    Undermining:  None    Scar revision: no    Skin repair:     Repair method:  Sutures    Suture size:  5-0    Suture material:  Prolene    Suture technique:  Simple interrupted    Number of sutures:  7  Approximation:     Approximation:  Close  Repair type:     Repair type:  Simple  Post-procedure details:     Dressing:  Non-adherent dressing and antibiotic ointment    Procedure completion:  Tolerated well, no immediate complications        Pio Gillespie MD  06/18/24 8656

## 2024-06-19 NOTE — ED NOTES
Bandage replaced over patient eye. Laceration is oozing - bleeding controlled with gauze and tape.

## 2024-06-19 NOTE — ED PROVIDER NOTES
[facility] EMERGENCY [facility]  EMERGENCY DEPARTMENT ENCOUNTER      Pt Name: Radha Mcdowell  MRN: [id number]  Birthdate 1980  Date of evaluation: 6/18/2024  Provider: Damián Kirkpatrick MD    CHIEF COMPLAINT       Chief Complaint   Patient presents with    Fall    Laceration         HISTORY OF PRESENT ILLNESS   (Location/Symptom, Timing/Onset, Context/Setting, Quality, Duration, Modifying Factors, Severity)  Note limiting factors.   43-year-old female with PMHx of breast cancer and hypertension presents to the emergency department via EMS for evaluation of a head injury sustained immediately prior to arrival and a mechanical ground-level fall.  Per EMS, patient had a fall a few hours ago, sustaining a laceration on the right eyebrow, no LOC, no blood thinners. Patient reports fell in a shower, slipped.  She has no additional complaints at this time    The history is provided by the patient.         Review of External Medical Records:     Nursing Notes were reviewed.    REVIEW OF SYSTEMS    (2-9 systems for level 4, 10 or more for level 5)     Review of Systems   Skin:  Positive for wound.       Except as noted above the remainder of the review of systems was reviewed and negative.       PAST MEDICAL HISTORY     Past Medical History:   Diagnosis Date    Arrhythmia     HEART MURMUR AGE 6    Breast cancer (HCC)     Calculus of kidney 2010    Cancer (HCC) 2018    R BREAST CA    Chronic kidney disease 2008    STONES     Hx antineoplastic chemo     Hypertension     NVD (normal vaginal delivery) 08/18/2017    Overweight(278.02) 8/28/2013         SURGICAL HISTORY       Past Surgical History:   Procedure Laterality Date    BREAST BIOPSY Right 2018    POSITIVE FOR CANCER    BREAST RECONSTRUCTION Right 11/6/2018    BREAST RECONSTRUCTION & PLACEMENT OF IMPLANT - performed by Gildardo Rodríguez MD at [facility] AMBULATORY OR    BREAST RECONSTRUCTION Bilateral 11/6/2018    PLACEMENT RIGHT BREAST GEL IMPLANT, &  LEFT BREAST REDUCTION FOR

## 2024-09-24 ENCOUNTER — TRANSCRIBE ORDERS (OUTPATIENT)
Facility: HOSPITAL | Age: 44
End: 2024-09-24

## 2024-09-24 DIAGNOSIS — Z12.31 VISIT FOR SCREENING MAMMOGRAM: Primary | ICD-10-CM

## 2024-10-08 ENCOUNTER — HOSPITAL ENCOUNTER (OUTPATIENT)
Facility: HOSPITAL | Age: 44
Discharge: HOME | End: 2024-10-11
Attending: SURGERY
Payer: COMMERCIAL

## 2024-10-08 VITALS — BODY MASS INDEX: 26.46 KG/M2 | WEIGHT: 155 LBS | HEIGHT: 64 IN

## 2024-10-08 DIAGNOSIS — Z12.31 VISIT FOR SCREENING MAMMOGRAM: ICD-10-CM

## 2024-10-08 PROCEDURE — 77063 BREAST TOMOSYNTHESIS BI: CPT

## 2024-10-10 ENCOUNTER — TELEPHONE (OUTPATIENT)
Age: 44
End: 2024-10-10

## 2024-10-10 NOTE — TELEPHONE ENCOUNTER
Patient called and left message about mammogram results. I called and spoke with the patient about her BIRADS1 mammogram results and told patient that she should schedule her annual appt with NIVIA Murphy as she has not been seen in clinic since 9/2023. Patient states she will call to make an appt when she has availability. Patient appreciative of call.

## 2024-10-21 ENCOUNTER — TELEPHONE (OUTPATIENT)
Age: 44
End: 2024-10-21

## 2024-10-21 NOTE — TELEPHONE ENCOUNTER
Lvm to call office for ER f/u     ----- Message from Dr. Asif Gonzáles MD sent at 10/20/2024  7:45 PM EDT -----  Regarding: needs appointment.  Please call patient.  Last seen in January.  She was in the ER last week.  She should scheduled a follow up with me to follow up on ER visit.

## 2024-10-22 ENCOUNTER — OFFICE VISIT (OUTPATIENT)
Age: 44
End: 2024-10-22
Payer: COMMERCIAL

## 2024-10-22 VITALS
WEIGHT: 152 LBS | HEIGHT: 66 IN | SYSTOLIC BLOOD PRESSURE: 128 MMHG | DIASTOLIC BLOOD PRESSURE: 81 MMHG | RESPIRATION RATE: 17 BRPM | OXYGEN SATURATION: 99 % | BODY MASS INDEX: 24.43 KG/M2 | HEART RATE: 91 BPM

## 2024-10-22 DIAGNOSIS — F51.04 PSYCHOPHYSIOLOGICAL INSOMNIA: ICD-10-CM

## 2024-10-22 DIAGNOSIS — F10.10 EXCESSIVE DRINKING OF ALCOHOL: Primary | ICD-10-CM

## 2024-10-22 DIAGNOSIS — F41.9 ANXIETY: ICD-10-CM

## 2024-10-22 PROCEDURE — 3074F SYST BP LT 130 MM HG: CPT | Performed by: INTERNAL MEDICINE

## 2024-10-22 PROCEDURE — 3079F DIAST BP 80-89 MM HG: CPT | Performed by: INTERNAL MEDICINE

## 2024-10-22 PROCEDURE — 99214 OFFICE O/P EST MOD 30 MIN: CPT | Performed by: INTERNAL MEDICINE

## 2024-10-22 RX ORDER — TRAZODONE HYDROCHLORIDE 50 MG/1
50 TABLET, FILM COATED ORAL NIGHTLY PRN
Qty: 30 TABLET | Refills: 0 | Status: SHIPPED | OUTPATIENT
Start: 2024-10-22

## 2024-10-22 ASSESSMENT — PATIENT HEALTH QUESTIONNAIRE - PHQ9
8. MOVING OR SPEAKING SO SLOWLY THAT OTHER PEOPLE COULD HAVE NOTICED. OR THE OPPOSITE, BEING SO FIGETY OR RESTLESS THAT YOU HAVE BEEN MOVING AROUND A LOT MORE THAN USUAL: NOT AT ALL
SUM OF ALL RESPONSES TO PHQ9 QUESTIONS 1 & 2: 1
1. LITTLE INTEREST OR PLEASURE IN DOING THINGS: NOT AT ALL
9. THOUGHTS THAT YOU WOULD BE BETTER OFF DEAD, OR OF HURTING YOURSELF: NOT AT ALL
2. FEELING DOWN, DEPRESSED OR HOPELESS: SEVERAL DAYS
6. FEELING BAD ABOUT YOURSELF - OR THAT YOU ARE A FAILURE OR HAVE LET YOURSELF OR YOUR FAMILY DOWN: NOT AT ALL
SUM OF ALL RESPONSES TO PHQ QUESTIONS 1-9: 2
10. IF YOU CHECKED OFF ANY PROBLEMS, HOW DIFFICULT HAVE THESE PROBLEMS MADE IT FOR YOU TO DO YOUR WORK, TAKE CARE OF THINGS AT HOME, OR GET ALONG WITH OTHER PEOPLE: NOT DIFFICULT AT ALL
SUM OF ALL RESPONSES TO PHQ QUESTIONS 1-9: 2
5. POOR APPETITE OR OVEREATING: NOT AT ALL
4. FEELING TIRED OR HAVING LITTLE ENERGY: NOT AT ALL
3. TROUBLE FALLING OR STAYING ASLEEP: SEVERAL DAYS
7. TROUBLE CONCENTRATING ON THINGS, SUCH AS READING THE NEWSPAPER OR WATCHING TELEVISION: NOT AT ALL

## 2024-10-22 ASSESSMENT — ENCOUNTER SYMPTOMS
CONSTIPATION: 0
DIARRHEA: 0
SHORTNESS OF BREATH: 0
ABDOMINAL PAIN: 0
NAUSEA: 0
VOMITING: 0
COUGH: 0

## 2024-10-22 NOTE — ASSESSMENT & PLAN NOTE
Chronic issue, fluctuating control, and not ideally controlled.  Recommended no alcohol in the house. Recommended AA and a sponsor. Declined anxiety meds at this time.  Reviewed avoiding known triggers.

## 2024-10-22 NOTE — PROGRESS NOTES
Patient identified with two identification factors, Name and Date of Birth.    Chief Complaint   Patient presents with    Follow-Up from Hospital       /81 (Site: Left Upper Arm, Position: Sitting, Cuff Size: Medium Adult)   Pulse 91   Resp 17   Ht 1.676 m (5' 6\")   Wt 68.9 kg (152 lb)   SpO2 99%   BMI 24.53 kg/m²       1. \"Have you been to the ER, urgent care clinic since your last visit?  Hospitalized since your last visit?\" Yes. 10/20/24 for fall due to alcohol     2. \"Have you seen or consulted any other health care providers outside of the [facility] since your last visit?\" No

## 2024-10-22 NOTE — PROGRESS NOTES
Radha Mcdowell is a 44 y.o. female who was seen in clinic today (10/22/2024).    Assessment & Plan:   Below is the assessment and plan developed based on review of pertinent history, physical exam, labs, studies, and medications.    1. Excessive drinking of alcohol  Assessment & Plan:  Chronic issue, fluctuating control, and not ideally controlled.  Recommended no alcohol in the house. Recommended AA and a sponsor. Declined anxiety meds at this time.  Reviewed avoiding known triggers.    2. Psychophysiological insomnia  Assessment & Plan:  Chronic issue, is worsening and not ideally controlled. I reviewed treatment options with her, sleep hygiene changes,  following changes were made today: will start on Trazodone.  Expectations and side effects reviewed.    Orders:  -     traZODone (DESYREL) 50 MG tablet; Take 1 tablet by mouth nightly as needed for Sleep, Disp-30 tablet, R-0Normal  3. Anxiety  Assessment & Plan:  Not ideally controlled, multiple different triggering events.  I reviewed treatment options with her, I recommended to see a counselor, I reviewed life style changes to help improve mood, and we did discuss medications.  She is not interested at this time but did review when to consider.   She didn't tolerate Lexapro in the past due to fatigue.       Return in about 3 months (around 1/22/2025) for regular follow up.   Subjective/Objective:   Radha was seen today for Follow-Up from Hospital     Since last visit: weight is stable.     Follow Up  Radha Mcdowell is seen for follow up from recent ED visit to [facility] on 10/13.  We reviewed the available records via [employer].  She presented after being found unresponsive by her family.  Her blood alcohol level was 0.587.  She reports there are a lot of triggers that cause her to drink (breast cancer commercials, news about war, etc).  She feels she is not sleeping well, will wake up at night some times.  This episode her family was

## 2024-10-22 NOTE — ASSESSMENT & PLAN NOTE
Chronic issue, is worsening and not ideally controlled. I reviewed treatment options with her, sleep hygiene changes,  following changes were made today: will start on Trazodone.  Expectations and side effects reviewed.

## 2024-10-22 NOTE — ASSESSMENT & PLAN NOTE
Not ideally controlled, multiple different triggering events.  I reviewed treatment options with her, I recommended to see a counselor, I reviewed life style changes to help improve mood, and we did discuss medications.  She is not interested at this time but did review when to consider.   She didn't tolerate Lexapro in the past due to fatigue.

## 2024-11-14 ENCOUNTER — TELEPHONE (OUTPATIENT)
Age: 44
End: 2024-11-14

## 2024-11-14 DIAGNOSIS — F51.04 PSYCHOPHYSIOLOGICAL INSOMNIA: ICD-10-CM

## 2024-11-14 RX ORDER — TRAZODONE HYDROCHLORIDE 50 MG/1
50 TABLET, FILM COATED ORAL NIGHTLY PRN
Qty: 90 TABLET | Refills: 0 | Status: SHIPPED | OUTPATIENT
Start: 2024-11-14

## 2024-11-14 NOTE — TELEPHONE ENCOUNTER
Chief Complaint   Patient presents with    Medication Refill     Last Appointment with Dr. Asif Gonzáles:  10/22/2024   Future Appointments   Date Time Provider Department Center   1/24/2025  9:20 AM Asif Gonzáles MD [facility] DEP   [facility]

## 2024-11-14 NOTE — TELEPHONE ENCOUNTER
Medication Refill Request    Starrcarmen July is requesting a refill of the following medication(s):     Trazodone (Desyrel) 50 MG tablet    Please send refill to:     [facility]/pharmacy #[id number] - [place], [place] - [address] -  [phone] - F [phone] [address] [address]  Phone: [phone] Fax: [phone]

## 2025-01-27 ENCOUNTER — OFFICE VISIT (OUTPATIENT)
Age: 45
End: 2025-01-27
Payer: COMMERCIAL

## 2025-01-27 VITALS
OXYGEN SATURATION: 99 % | RESPIRATION RATE: 16 BRPM | DIASTOLIC BLOOD PRESSURE: 88 MMHG | SYSTOLIC BLOOD PRESSURE: 132 MMHG | BODY MASS INDEX: 25.81 KG/M2 | TEMPERATURE: 97.5 F | WEIGHT: 160.58 LBS | HEIGHT: 66 IN | HEART RATE: 89 BPM

## 2025-01-27 DIAGNOSIS — M79.672 LEFT FOOT PAIN: ICD-10-CM

## 2025-01-27 DIAGNOSIS — Z53.20 COLON CANCER SCREENING DECLINED: ICD-10-CM

## 2025-01-27 DIAGNOSIS — F10.10 EXCESSIVE DRINKING OF ALCOHOL: ICD-10-CM

## 2025-01-27 DIAGNOSIS — I10 ESSENTIAL HYPERTENSION: ICD-10-CM

## 2025-01-27 DIAGNOSIS — F41.9 ANXIETY: Primary | ICD-10-CM

## 2025-01-27 PROCEDURE — 3079F DIAST BP 80-89 MM HG: CPT | Performed by: INTERNAL MEDICINE

## 2025-01-27 PROCEDURE — 99214 OFFICE O/P EST MOD 30 MIN: CPT | Performed by: INTERNAL MEDICINE

## 2025-01-27 PROCEDURE — 3075F SYST BP GE 130 - 139MM HG: CPT | Performed by: INTERNAL MEDICINE

## 2025-01-27 RX ORDER — ASCORBIC ACID 500 MG
500 TABLET ORAL DAILY
COMMUNITY

## 2025-01-27 RX ORDER — LANOLIN ALCOHOL/MO/W.PET/CERES
1000 CREAM (GRAM) TOPICAL DAILY
COMMUNITY

## 2025-01-27 SDOH — ECONOMIC STABILITY: FOOD INSECURITY: WITHIN THE PAST 12 MONTHS, YOU WORRIED THAT YOUR FOOD WOULD RUN OUT BEFORE YOU GOT MONEY TO BUY MORE.: NEVER TRUE

## 2025-01-27 SDOH — ECONOMIC STABILITY: FOOD INSECURITY: WITHIN THE PAST 12 MONTHS, THE FOOD YOU BOUGHT JUST DIDN'T LAST AND YOU DIDN'T HAVE MONEY TO GET MORE.: NEVER TRUE

## 2025-01-27 ASSESSMENT — PATIENT HEALTH QUESTIONNAIRE - PHQ9
1. LITTLE INTEREST OR PLEASURE IN DOING THINGS: NOT AT ALL
9. THOUGHTS THAT YOU WOULD BE BETTER OFF DEAD, OR OF HURTING YOURSELF: NOT AT ALL
SUM OF ALL RESPONSES TO PHQ QUESTIONS 1-9: 2
7. TROUBLE CONCENTRATING ON THINGS, SUCH AS READING THE NEWSPAPER OR WATCHING TELEVISION: NOT AT ALL
SUM OF ALL RESPONSES TO PHQ9 QUESTIONS 1 & 2: 0
4. FEELING TIRED OR HAVING LITTLE ENERGY: SEVERAL DAYS
10. IF YOU CHECKED OFF ANY PROBLEMS, HOW DIFFICULT HAVE THESE PROBLEMS MADE IT FOR YOU TO DO YOUR WORK, TAKE CARE OF THINGS AT HOME, OR GET ALONG WITH OTHER PEOPLE: SOMEWHAT DIFFICULT
3. TROUBLE FALLING OR STAYING ASLEEP: SEVERAL DAYS
6. FEELING BAD ABOUT YOURSELF - OR THAT YOU ARE A FAILURE OR HAVE LET YOURSELF OR YOUR FAMILY DOWN: NOT AT ALL
2. FEELING DOWN, DEPRESSED OR HOPELESS: NOT AT ALL
8. MOVING OR SPEAKING SO SLOWLY THAT OTHER PEOPLE COULD HAVE NOTICED. OR THE OPPOSITE, BEING SO FIGETY OR RESTLESS THAT YOU HAVE BEEN MOVING AROUND A LOT MORE THAN USUAL: NOT AT ALL
SUM OF ALL RESPONSES TO PHQ QUESTIONS 1-9: 2

## 2025-01-27 ASSESSMENT — ENCOUNTER SYMPTOMS
NAUSEA: 0
VOMITING: 0
ABDOMINAL PAIN: 0
COUGH: 0
CONSTIPATION: 0
DIARRHEA: 0
SHORTNESS OF BREATH: 0

## 2025-01-27 NOTE — PROGRESS NOTES
Radha Mcdowell is a 44 y.o. female who was seen in clinic today (1/27/2025).    Assessment & Plan:   Below is the assessment and plan developed based on review of pertinent history, physical exam, labs, studies, and medications.  Assessment & Plan  1. Anxiety: Improved and well-managed. Continue stress reduction strategies. Trazodone as needed. Avoid using alcohol for stress reduction.    2. Alcohol consumption: Intake significantly decreased. Recommended to avoid keeping alcohol at home and with her history consider stopping drinking all together.     3. Hypertension. Chronic issue that is well controlled. Home readings lower then office readings. Remain off amlodipine as that was likely causing the fatigue.    4. Left foot pain. This is a new problem, symptoms are: intermittent, differential dx reviewed with the patient, favor due to shoe wear. Reviewed using insoles or better shoes.  Red flags were reviewed with the patient to RTC or notify me.  Expected time course for resolution reviewed.  .     5. Colon cancer declined. Discussed guidelines and options for screening (colonoscopy vs Cologuard). She declined and wants to revisit next year.       Diagnoses/Orders:   1. Anxiety  2. Excessive drinking of alcohol  3. Essential hypertension  4. Left foot pain  5. Colon cancer screening declined     Return in about 4 months (around 5/27/2025) for FULL PHYSICAL.   Subjective/Objective:   Radha was seen today for Follow-up     Since last visit: weight is stable.    History of Present Illness  The patient is a 44-year-old female here for a 3-month follow-up.    Previously prescribed trazodone for anxiety, which she discontinued after 2 nights. Reports no adverse effects and slight improvement. Didn't feel like she needed it. Anxiety levels have significantly improved, with annual exacerbations at this time of the year. All due to her father's death anniversary and her cancer diagnosis. Manages stress through cooking,

## 2025-03-13 ENCOUNTER — HOSPITAL ENCOUNTER (EMERGENCY)
Facility: HOSPITAL | Age: 45
Discharge: HOME | End: 2025-03-13
Attending: EMERGENCY MEDICINE
Payer: COMMERCIAL

## 2025-03-13 ENCOUNTER — APPOINTMENT (OUTPATIENT)
Facility: HOSPITAL | Age: 45
End: 2025-03-13
Attending: EMERGENCY MEDICINE
Payer: COMMERCIAL

## 2025-03-13 VITALS
SYSTOLIC BLOOD PRESSURE: 163 MMHG | TEMPERATURE: 97.7 F | DIASTOLIC BLOOD PRESSURE: 110 MMHG | RESPIRATION RATE: 25 BRPM | HEIGHT: 66 IN | BODY MASS INDEX: 24.34 KG/M2 | HEART RATE: 105 BPM | OXYGEN SATURATION: 98 % | WEIGHT: 151.46 LBS

## 2025-03-13 DIAGNOSIS — E86.0 DEHYDRATION: ICD-10-CM

## 2025-03-13 DIAGNOSIS — F10.10 ALCOHOL ABUSE: ICD-10-CM

## 2025-03-13 DIAGNOSIS — F10.920 ACUTE ALCOHOLIC INTOXICATION WITHOUT COMPLICATION: Primary | ICD-10-CM

## 2025-03-13 DIAGNOSIS — T74.91XA DOMESTIC VIOLENCE OF ADULT, INITIAL ENCOUNTER: ICD-10-CM

## 2025-03-13 LAB
ALBUMIN SERPL-MCNC: 4.4 G/DL (ref 3.5–5)
ALBUMIN/GLOB SERPL: 1 (ref 1.1–2.2)
ALP SERPL-CCNC: 89 U/L (ref 45–117)
ALT SERPL-CCNC: 24 U/L (ref 12–78)
AMPHET UR QL SCN: NEGATIVE
ANION GAP SERPL CALC-SCNC: 16 MMOL/L (ref 2–12)
APAP SERPL-MCNC: <2 UG/ML (ref 10–30)
AST SERPL-CCNC: 20 U/L (ref 15–37)
BARBITURATES UR QL SCN: NEGATIVE
BASOPHILS # BLD: 0.07 K/UL (ref 0–0.1)
BASOPHILS NFR BLD: 1.1 % (ref 0–1)
BENZODIAZ UR QL: NEGATIVE
BILIRUB SERPL-MCNC: 0.3 MG/DL (ref 0.2–1)
BUN SERPL-MCNC: 9 MG/DL (ref 6–20)
BUN/CREAT SERPL: 10 (ref 12–20)
CALCIUM SERPL-MCNC: 9.3 MG/DL (ref 8.5–10.1)
CANNABINOIDS UR QL SCN: NEGATIVE
CHLORIDE SERPL-SCNC: 105 MMOL/L (ref 97–108)
CO2 SERPL-SCNC: 25 MMOL/L (ref 21–32)
COCAINE UR QL SCN: NEGATIVE
CREAT SERPL-MCNC: 0.86 MG/DL (ref 0.55–1.02)
DIFFERENTIAL METHOD BLD: ABNORMAL
EKG ATRIAL RATE: 130 BPM
EKG DIAGNOSIS: NORMAL
EKG P AXIS: 38 DEGREES
EKG P-R INTERVAL: 162 MS
EKG Q-T INTERVAL: 292 MS
EKG QRS DURATION: 94 MS
EKG QTC CALCULATION (BAZETT): 429 MS
EKG R AXIS: -16 DEGREES
EKG T AXIS: 68 DEGREES
EKG VENTRICULAR RATE: 130 BPM
EOSINOPHIL # BLD: 0.01 K/UL (ref 0–0.4)
EOSINOPHIL NFR BLD: 0.2 % (ref 0–7)
ERYTHROCYTE [DISTWIDTH] IN BLOOD BY AUTOMATED COUNT: 12.1 % (ref 11.5–14.5)
ETHANOL SERPL-MCNC: 414 MG/DL (ref 0–0.08)
GLOBULIN SER CALC-MCNC: 4.2 G/DL (ref 2–4)
GLUCOSE SERPL-MCNC: 92 MG/DL (ref 65–100)
HCT VFR BLD AUTO: 47.7 % (ref 35–47)
HGB BLD-MCNC: 16.3 G/DL (ref 11.5–16)
IMM GRANULOCYTES # BLD AUTO: 0.03 K/UL (ref 0–0.04)
IMM GRANULOCYTES NFR BLD AUTO: 0.5 % (ref 0–0.5)
LYMPHOCYTES # BLD: 2.64 K/UL (ref 0.8–3.5)
LYMPHOCYTES NFR BLD: 40.2 % (ref 12–49)
Lab: NORMAL
MCH RBC QN AUTO: 31.7 PG (ref 26–34)
MCHC RBC AUTO-ENTMCNC: 34.2 G/DL (ref 30–36.5)
MCV RBC AUTO: 92.6 FL (ref 80–99)
METHADONE UR QL: NEGATIVE
MONOCYTES # BLD: 0.3 K/UL (ref 0–1)
MONOCYTES NFR BLD: 4.6 % (ref 5–13)
NEUTS SEG # BLD: 3.52 K/UL (ref 1.8–8)
NEUTS SEG NFR BLD: 53.4 % (ref 32–75)
NRBC # BLD: 0 K/UL (ref 0–0.01)
NRBC BLD-RTO: 0 PER 100 WBC
OPIATES UR QL: NEGATIVE
PCP UR QL: NEGATIVE
PLATELET # BLD AUTO: 338 K/UL (ref 150–400)
PMV BLD AUTO: 9 FL (ref 8.9–12.9)
POTASSIUM SERPL-SCNC: 3.7 MMOL/L (ref 3.5–5.1)
PROT SERPL-MCNC: 8.6 G/DL (ref 6.4–8.2)
RBC # BLD AUTO: 5.15 M/UL (ref 3.8–5.2)
SALICYLATES SERPL-MCNC: <1.7 MG/DL (ref 2.8–20)
SODIUM SERPL-SCNC: 146 MMOL/L (ref 136–145)
WBC # BLD AUTO: 6.6 K/UL (ref 3.6–11)

## 2025-03-13 PROCEDURE — 36415 COLL VENOUS BLD VENIPUNCTURE: CPT

## 2025-03-13 PROCEDURE — 96361 HYDRATE IV INFUSION ADD-ON: CPT

## 2025-03-13 PROCEDURE — 82077 ASSAY SPEC XCP UR&BREATH IA: CPT

## 2025-03-13 PROCEDURE — 99284 EMERGENCY DEPT VISIT MOD MDM: CPT

## 2025-03-13 PROCEDURE — 85025 COMPLETE CBC W/AUTO DIFF WBC: CPT

## 2025-03-13 PROCEDURE — 96360 HYDRATION IV INFUSION INIT: CPT

## 2025-03-13 PROCEDURE — 80053 COMPREHEN METABOLIC PANEL: CPT

## 2025-03-13 PROCEDURE — 2580000003 HC RX 258: Performed by: EMERGENCY MEDICINE

## 2025-03-13 PROCEDURE — 80307 DRUG TEST PRSMV CHEM ANLYZR: CPT

## 2025-03-13 PROCEDURE — 4500000002 HC ER NO CHARGE

## 2025-03-13 PROCEDURE — 80143 DRUG ASSAY ACETAMINOPHEN: CPT

## 2025-03-13 PROCEDURE — 93005 ELECTROCARDIOGRAM TRACING: CPT | Performed by: EMERGENCY MEDICINE

## 2025-03-13 PROCEDURE — 80179 DRUG ASSAY SALICYLATE: CPT

## 2025-03-13 RX ORDER — IOPAMIDOL 755 MG/ML
100 INJECTION, SOLUTION INTRAVASCULAR
Status: DISCONTINUED | OUTPATIENT
Start: 2025-03-13 | End: 2025-03-13

## 2025-03-13 RX ORDER — 0.9 % SODIUM CHLORIDE 0.9 %
1000 INTRAVENOUS SOLUTION INTRAVENOUS ONCE
Status: COMPLETED | OUTPATIENT
Start: 2025-03-13 | End: 2025-03-13

## 2025-03-13 RX ADMIN — SODIUM CHLORIDE 1000 ML: 0.9 INJECTION, SOLUTION INTRAVENOUS at 19:17

## 2025-03-13 RX ADMIN — SODIUM CHLORIDE 1000 ML: 0.9 INJECTION, SOLUTION INTRAVENOUS at 16:48

## 2025-03-13 ASSESSMENT — ENCOUNTER SYMPTOMS
COUGH: 0
SHORTNESS OF BREATH: 0
WHEEZING: 0
VOMITING: 0
NAUSEA: 0
ABDOMINAL PAIN: 0

## 2025-03-13 NOTE — BSMART NOTE
BSMART assessment completed, and suicide risk level noted to be LOW. Primary Nurse Araseli and Charge Nurse JOY and Physician Dr. Murillo notified. Concerns not observed.
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
## 2025-03-13 NOTE — BSMART NOTE
Resources for pt sent to include:  [facility] Crisis and Same Day Access, Therapy/Psychiatry resources and SA resources.
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
## 2025-03-14 NOTE — ED NOTES
Patient left ED in no acute distress, alert and oriented x4. Patient was encourage to come back if symptoms get worse. Patient was provided with discharge instructions and prescriptions. All questions were answered. Patient left ambulatory with her

## 2025-03-18 ENCOUNTER — OFFICE VISIT (OUTPATIENT)
Age: 45
End: 2025-03-18
Payer: COMMERCIAL

## 2025-03-18 VITALS
HEIGHT: 66 IN | WEIGHT: 154.6 LBS | OXYGEN SATURATION: 100 % | DIASTOLIC BLOOD PRESSURE: 95 MMHG | SYSTOLIC BLOOD PRESSURE: 138 MMHG | BODY MASS INDEX: 24.85 KG/M2 | HEART RATE: 78 BPM | RESPIRATION RATE: 16 BRPM | TEMPERATURE: 97.7 F

## 2025-03-18 DIAGNOSIS — F32.5 MAJOR DEPRESSIVE DISORDER WITH SINGLE EPISODE, IN FULL REMISSION: ICD-10-CM

## 2025-03-18 DIAGNOSIS — L03.031 PARONYCHIA OF GREAT TOE OF RIGHT FOOT: ICD-10-CM

## 2025-03-18 DIAGNOSIS — T74.91XA DOMESTIC VIOLENCE OF ADULT, INITIAL ENCOUNTER: ICD-10-CM

## 2025-03-18 DIAGNOSIS — F10.10 EXCESSIVE DRINKING OF ALCOHOL: Primary | ICD-10-CM

## 2025-03-18 DIAGNOSIS — F41.9 ANXIETY: ICD-10-CM

## 2025-03-18 PROCEDURE — 3080F DIAST BP >= 90 MM HG: CPT | Performed by: INTERNAL MEDICINE

## 2025-03-18 PROCEDURE — 3075F SYST BP GE 130 - 139MM HG: CPT | Performed by: INTERNAL MEDICINE

## 2025-03-18 PROCEDURE — 99214 OFFICE O/P EST MOD 30 MIN: CPT | Performed by: INTERNAL MEDICINE

## 2025-03-18 RX ORDER — CEPHALEXIN 500 MG/1
500 CAPSULE ORAL 4 TIMES DAILY
Qty: 28 CAPSULE | Refills: 0 | Status: SHIPPED | OUTPATIENT
Start: 2025-03-18 | End: 2025-03-25

## 2025-03-18 RX ORDER — FLUOXETINE 10 MG/1
10 CAPSULE ORAL DAILY
Qty: 30 CAPSULE | Refills: 1 | Status: SHIPPED | OUTPATIENT
Start: 2025-03-18

## 2025-03-18 SDOH — ECONOMIC STABILITY: FOOD INSECURITY: WITHIN THE PAST 12 MONTHS, THE FOOD YOU BOUGHT JUST DIDN'T LAST AND YOU DIDN'T HAVE MONEY TO GET MORE.: NEVER TRUE

## 2025-03-18 SDOH — ECONOMIC STABILITY: FOOD INSECURITY: WITHIN THE PAST 12 MONTHS, YOU WORRIED THAT YOUR FOOD WOULD RUN OUT BEFORE YOU GOT MONEY TO BUY MORE.: NEVER TRUE

## 2025-03-18 ASSESSMENT — PATIENT HEALTH QUESTIONNAIRE - PHQ9
6. FEELING BAD ABOUT YOURSELF - OR THAT YOU ARE A FAILURE OR HAVE LET YOURSELF OR YOUR FAMILY DOWN: NOT AT ALL
1. LITTLE INTEREST OR PLEASURE IN DOING THINGS: NOT AT ALL
8. MOVING OR SPEAKING SO SLOWLY THAT OTHER PEOPLE COULD HAVE NOTICED. OR THE OPPOSITE, BEING SO FIGETY OR RESTLESS THAT YOU HAVE BEEN MOVING AROUND A LOT MORE THAN USUAL: NOT AT ALL
SUM OF ALL RESPONSES TO PHQ QUESTIONS 1-9: 3
2. FEELING DOWN, DEPRESSED OR HOPELESS: SEVERAL DAYS
3. TROUBLE FALLING OR STAYING ASLEEP: SEVERAL DAYS
SUM OF ALL RESPONSES TO PHQ QUESTIONS 1-9: 3
4. FEELING TIRED OR HAVING LITTLE ENERGY: SEVERAL DAYS
9. THOUGHTS THAT YOU WOULD BE BETTER OFF DEAD, OR OF HURTING YOURSELF: NOT AT ALL
5. POOR APPETITE OR OVEREATING: NOT AT ALL
7. TROUBLE CONCENTRATING ON THINGS, SUCH AS READING THE NEWSPAPER OR WATCHING TELEVISION: NOT AT ALL
10. IF YOU CHECKED OFF ANY PROBLEMS, HOW DIFFICULT HAVE THESE PROBLEMS MADE IT FOR YOU TO DO YOUR WORK, TAKE CARE OF THINGS AT HOME, OR GET ALONG WITH OTHER PEOPLE: SOMEWHAT DIFFICULT
SUM OF ALL RESPONSES TO PHQ QUESTIONS 1-9: 3
SUM OF ALL RESPONSES TO PHQ QUESTIONS 1-9: 3

## 2025-03-18 ASSESSMENT — ENCOUNTER SYMPTOMS
CONSTIPATION: 0
VOMITING: 0
NAUSEA: 0
COUGH: 0
DIARRHEA: 0
SHORTNESS OF BREATH: 0
ABDOMINAL PAIN: 0
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
## 2025-04-07 ENCOUNTER — TELEPHONE (OUTPATIENT)
Age: 45
End: 2025-04-07

## 2025-04-07 DIAGNOSIS — L03.031 PARONYCHIA OF GREAT TOE OF RIGHT FOOT: Primary | ICD-10-CM

## 2025-04-07 RX ORDER — SULFAMETHOXAZOLE AND TRIMETHOPRIM 800; 160 MG/1; MG/1
1 TABLET ORAL 2 TIMES DAILY
Qty: 14 TABLET | Refills: 0 | Status: SHIPPED | OUTPATIENT
Start: 2025-04-07 | End: 2025-04-14

## 2025-04-07 NOTE — TELEPHONE ENCOUNTER
Reason for call:  Spoke with pt. Per pt she finish the medication prescribe by PCP Cephalexin 500 mg.   She still have yellow discharge on her ingrowing toe nails. She would like to know if PCP can prescribe her with and other medication that can help her or does she need an appointment?    Is this a new problem: Yes    Date of last appointment:  3/18/2025     Can we respond via [employer]: No    Best call back number: Radha Mcdowell   [phone]

## 2025-04-07 NOTE — TELEPHONE ENCOUNTER
Patient report toe was getting better, but sx have return. On feet most of the day. Right foot, big toe. Yellow discharge, swollen, redness. Difficult walking, limping. Having to wear Crocs for comfort.  Tried salty water twice daily, it does help with discharge and comfort. Completed antibx. Asking for another round of antibx. Have not seen podiatry.

## 2025-04-07 NOTE — TELEPHONE ENCOUNTER
She needs to see podiatry.  They will likely need to trim back/cut her toe nail.  This antibiotic really should have worked.  I can try one more but if this doesn't work I am out of antibiotic options and it is up to the podiatrist

## 2025-05-07 ENCOUNTER — OFFICE VISIT (OUTPATIENT)
Age: 45
End: 2025-05-07
Payer: COMMERCIAL

## 2025-05-07 VITALS
BODY MASS INDEX: 25.36 KG/M2 | HEART RATE: 66 BPM | HEIGHT: 66 IN | SYSTOLIC BLOOD PRESSURE: 124 MMHG | DIASTOLIC BLOOD PRESSURE: 87 MMHG | OXYGEN SATURATION: 99 % | RESPIRATION RATE: 16 BRPM | TEMPERATURE: 97.9 F | WEIGHT: 157.8 LBS

## 2025-05-07 DIAGNOSIS — L03.031 PARONYCHIA OF GREAT TOE OF RIGHT FOOT: ICD-10-CM

## 2025-05-07 DIAGNOSIS — F10.10 EXCESSIVE DRINKING OF ALCOHOL: ICD-10-CM

## 2025-05-07 DIAGNOSIS — F41.9 ANXIETY: Primary | ICD-10-CM

## 2025-05-07 PROCEDURE — 3074F SYST BP LT 130 MM HG: CPT | Performed by: INTERNAL MEDICINE

## 2025-05-07 PROCEDURE — 3079F DIAST BP 80-89 MM HG: CPT | Performed by: INTERNAL MEDICINE

## 2025-05-07 PROCEDURE — 99214 OFFICE O/P EST MOD 30 MIN: CPT | Performed by: INTERNAL MEDICINE

## 2025-05-07 SDOH — ECONOMIC STABILITY: FOOD INSECURITY: WITHIN THE PAST 12 MONTHS, THE FOOD YOU BOUGHT JUST DIDN'T LAST AND YOU DIDN'T HAVE MONEY TO GET MORE.: NEVER TRUE

## 2025-05-07 SDOH — ECONOMIC STABILITY: FOOD INSECURITY: WITHIN THE PAST 12 MONTHS, YOU WORRIED THAT YOUR FOOD WOULD RUN OUT BEFORE YOU GOT MONEY TO BUY MORE.: NEVER TRUE

## 2025-05-07 ASSESSMENT — PATIENT HEALTH QUESTIONNAIRE - PHQ9
6. FEELING BAD ABOUT YOURSELF - OR THAT YOU ARE A FAILURE OR HAVE LET YOURSELF OR YOUR FAMILY DOWN: NOT AT ALL
SUM OF ALL RESPONSES TO PHQ QUESTIONS 1-9: 1
SUM OF ALL RESPONSES TO PHQ QUESTIONS 1-9: 1
3. TROUBLE FALLING OR STAYING ASLEEP: SEVERAL DAYS
10. IF YOU CHECKED OFF ANY PROBLEMS, HOW DIFFICULT HAVE THESE PROBLEMS MADE IT FOR YOU TO DO YOUR WORK, TAKE CARE OF THINGS AT HOME, OR GET ALONG WITH OTHER PEOPLE: NOT DIFFICULT AT ALL
7. TROUBLE CONCENTRATING ON THINGS, SUCH AS READING THE NEWSPAPER OR WATCHING TELEVISION: NOT AT ALL
5. POOR APPETITE OR OVEREATING: NOT AT ALL
4. FEELING TIRED OR HAVING LITTLE ENERGY: NOT AT ALL
8. MOVING OR SPEAKING SO SLOWLY THAT OTHER PEOPLE COULD HAVE NOTICED. OR THE OPPOSITE, BEING SO FIGETY OR RESTLESS THAT YOU HAVE BEEN MOVING AROUND A LOT MORE THAN USUAL: NOT AT ALL
1. LITTLE INTEREST OR PLEASURE IN DOING THINGS: NOT AT ALL
9. THOUGHTS THAT YOU WOULD BE BETTER OFF DEAD, OR OF HURTING YOURSELF: NOT AT ALL
SUM OF ALL RESPONSES TO PHQ QUESTIONS 1-9: 1
SUM OF ALL RESPONSES TO PHQ QUESTIONS 1-9: 1
2. FEELING DOWN, DEPRESSED OR HOPELESS: NOT AT ALL

## 2025-05-07 NOTE — PATIENT INSTRUCTIONS
[facility]    Neto Fernandez, Jasmin Orozco, or Damián Porter (erick ARREOLA).    [phone]   [address], [address], [place] [address]    =======================================    [facility]    Deena ARREOLAElvira De PazVan [facility]  [phone]   [address], [facility] [address], [place] [address]    ========================================    [facility]    [place]  [phone]  [address], [place] [address]

## 2025-05-07 NOTE — PROGRESS NOTES
Radha Mcdowell is a 44 y.o. female who was seen in clinic today (5/7/2025).      Assessment & Plan:   Below is the assessment and plan developed based on review of pertinent history, physical exam, labs, studies, and medications.  Assessment & Plan  Anxiety.  Significantly improved. Mood fluctuations are normal and reviewed expectations. Will avoid further SSRI but can start SNRI if need be. Advised to continue AA and counseling sessions. Discussed potential benefits of marriage counseling.    Alcohol use disorder.  Improved. Commendable progress in managing alcohol use disorder. Encouraged to maintain alcohol-free environment and avoid triggers. Emphasized importance of continuing AA and [facility] counseling sessions. Advised to consider marriage counseling.    Paronychia.  Persistent infection despite two antibiotics. Will defer a 3rd antibiotic and reports she needs to see podiatrist to trim or removal toe nail and drain infection. List of providers and referral provided today. She will call to sched     Diagnoses/Orders:   1. Anxiety  2. Excessive drinking of alcohol  3. Paronychia of great toe of right foot  -     [facility] - Neto Fernandez DPM, [facility] ([address])     Return in about 1 month (around 6/7/2025) for FULL PHYSICAL.   Subjective/Objective:   Radha was seen today for Follow-up, Discuss Medications, and Ingrown Toenail     Since last visit: weight is stable.     History of Present Illness  The patient is here for a 6-week follow-up.    She reports improved mood from last visit. She has been abstinent from alcohol and seeing a counselor. She completed five sessions of [facility] counseling, but had to stop due to financial burden. She continues [facility] mental health [facility], finding it beneficial. She has started to participate in [facility] on Fridays and AA meetings on Mondays, reporting no current alcohol consumption and no alcohol at home. Positive changes in

## 2025-06-11 ENCOUNTER — OFFICE VISIT (OUTPATIENT)
Age: 45
End: 2025-06-11
Payer: COMMERCIAL

## 2025-06-11 VITALS
HEIGHT: 66 IN | TEMPERATURE: 97.5 F | WEIGHT: 148 LBS | HEART RATE: 111 BPM | DIASTOLIC BLOOD PRESSURE: 74 MMHG | SYSTOLIC BLOOD PRESSURE: 106 MMHG | OXYGEN SATURATION: 99 % | BODY MASS INDEX: 23.78 KG/M2 | RESPIRATION RATE: 16 BRPM

## 2025-06-11 DIAGNOSIS — F41.9 ANXIETY: ICD-10-CM

## 2025-06-11 DIAGNOSIS — F32.5 MAJOR DEPRESSIVE DISORDER WITH SINGLE EPISODE, IN FULL REMISSION: ICD-10-CM

## 2025-06-11 DIAGNOSIS — F10.11 HISTORY OF ALCOHOL ABUSE: ICD-10-CM

## 2025-06-11 DIAGNOSIS — Z00.00 ROUTINE PHYSICAL EXAMINATION: Primary | ICD-10-CM

## 2025-06-11 DIAGNOSIS — Z12.11 COLON CANCER SCREENING: ICD-10-CM

## 2025-06-11 PROCEDURE — 99396 PREV VISIT EST AGE 40-64: CPT | Performed by: INTERNAL MEDICINE

## 2025-06-11 PROCEDURE — 3074F SYST BP LT 130 MM HG: CPT | Performed by: INTERNAL MEDICINE

## 2025-06-11 PROCEDURE — 3078F DIAST BP <80 MM HG: CPT | Performed by: INTERNAL MEDICINE

## 2025-06-11 SDOH — ECONOMIC STABILITY: FOOD INSECURITY: WITHIN THE PAST 12 MONTHS, THE FOOD YOU BOUGHT JUST DIDN'T LAST AND YOU DIDN'T HAVE MONEY TO GET MORE.: NEVER TRUE

## 2025-06-11 SDOH — ECONOMIC STABILITY: FOOD INSECURITY: WITHIN THE PAST 12 MONTHS, YOU WORRIED THAT YOUR FOOD WOULD RUN OUT BEFORE YOU GOT MONEY TO BUY MORE.: NEVER TRUE

## 2025-06-11 ASSESSMENT — ENCOUNTER SYMPTOMS
DIARRHEA: 0
SHORTNESS OF BREATH: 0
CONSTIPATION: 0
ABDOMINAL PAIN: 0
BLOOD IN STOOL: 0
NAUSEA: 0
VOMITING: 0
COUGH: 0

## 2025-06-11 ASSESSMENT — PATIENT HEALTH QUESTIONNAIRE - PHQ9
2. FEELING DOWN, DEPRESSED OR HOPELESS: SEVERAL DAYS
SUM OF ALL RESPONSES TO PHQ QUESTIONS 1-9: 1
1. LITTLE INTEREST OR PLEASURE IN DOING THINGS: NOT AT ALL

## 2025-06-11 ASSESSMENT — LIFESTYLE VARIABLES
HOW MANY STANDARD DRINKS CONTAINING ALCOHOL DO YOU HAVE ON A TYPICAL DAY: 1 OR 2
HOW OFTEN DO YOU HAVE A DRINK CONTAINING ALCOHOL: MONTHLY OR LESS

## 2025-06-11 NOTE — ASSESSMENT & PLAN NOTE
Slightly worse from last visit, but not bad, multiple different triggering events, but biggest one being marriage at this time. I reviewed treatment options with her, continue to follow up with specialist, I reviewed life style changes to help improve mood,and we did discuss medications. She is not interested at this time but did review when to consider. We reviewed using Effexor to help with possible pre-menopausal symptoms if she desires.

## 2025-06-11 NOTE — ASSESSMENT & PLAN NOTE
Not ideally controlled, but is improved, still with multiple different triggering events. Biggest one being marriage at this time. I reviewed treatment options with her, continue to follow up with specialist, I reviewed life style changes to help improve mood, and we did discuss medications. She is not interested at this time but did review when to consider.  We reviewed using Effexor to help with possible pre-menopausal symptoms if she desires.

## 2025-06-11 NOTE — PROGRESS NOTES
for the children. She is actively participating in counseling and brought in a letter to that effect.       Objective:   Physical Exam  Constitutional:       General: She is not in acute distress.     Appearance: Normal appearance.   HENT:      Right Ear: Tympanic membrane and ear canal normal.      Left Ear: Tympanic membrane and ear canal normal.   Cardiovascular:      Rate and Rhythm: Regular rhythm. Tachycardia present.      Heart sounds: Normal heart sounds.   Pulmonary:      Effort: Pulmonary effort is normal.      Breath sounds: Normal breath sounds.   Abdominal:      General: Bowel sounds are normal. There is no distension.      Palpations: Abdomen is soft.      Tenderness: There is no abdominal tenderness.   Musculoskeletal:      Right lower leg: No edema.      Left lower leg: No edema.   Lymphadenopathy:      Cervical: No cervical adenopathy.   Psychiatric:         Mood and Affect: Mood normal.          Vitals:    06/11/25 1403   BP: 106/74   Pulse: (!) 111   Resp: 16   Temp: 97.5 °F (36.4 °C)   TempSrc: Temporal   SpO2: 99%   Weight: 67.1 kg (148 lb)   Height: 1.675 m (5' 5.95\")      Body mass index is 23.93 kg/m².      Asif Gonzáles MD

## 2025-06-11 NOTE — ASSESSMENT & PLAN NOTE
Chronic issue, fluctuating control, and is improved. Only one episode of drinking since last visit. Continue to participate in outpatient counseling. Continue to avoid triggers. Avoid alcohol in the house. Congratulated on her sobriety. Reassured a history of drinking doesn't make her a bad mother.

## 2025-06-17 DIAGNOSIS — Z00.00 ROUTINE PHYSICAL EXAMINATION: ICD-10-CM

## 2025-06-17 LAB
ALBUMIN SERPL-MCNC: 3.6 G/DL (ref 3.5–5)
ALBUMIN/GLOB SERPL: 1.2 (ref 1.1–2.2)
ALP SERPL-CCNC: 76 U/L (ref 45–117)
ALT SERPL-CCNC: 19 U/L (ref 12–78)
ANION GAP SERPL CALC-SCNC: 5 MMOL/L (ref 2–12)
AST SERPL-CCNC: 13 U/L (ref 15–37)
BILIRUB SERPL-MCNC: 0.2 MG/DL (ref 0.2–1)
BUN SERPL-MCNC: 6 MG/DL (ref 6–20)
BUN/CREAT SERPL: 10 (ref 12–20)
CALCIUM SERPL-MCNC: 9.1 MG/DL (ref 8.5–10.1)
CHLORIDE SERPL-SCNC: 106 MMOL/L (ref 97–108)
CHOLEST SERPL-MCNC: 189 MG/DL
CO2 SERPL-SCNC: 27 MMOL/L (ref 21–32)
CREAT SERPL-MCNC: 0.62 MG/DL (ref 0.55–1.02)
ERYTHROCYTE [DISTWIDTH] IN BLOOD BY AUTOMATED COUNT: 12.6 % (ref 11.5–14.5)
GLOBULIN SER CALC-MCNC: 3.1 G/DL (ref 2–4)
GLUCOSE SERPL-MCNC: 97 MG/DL (ref 65–100)
HCT VFR BLD AUTO: 40.6 % (ref 35–47)
HDLC SERPL-MCNC: 89 MG/DL
HDLC SERPL: 2.1 (ref 0–5)
HGB BLD-MCNC: 13 G/DL (ref 11.5–16)
HIV 1+2 AB+HIV1 P24 AG SERPL QL IA: NONREACTIVE
HIV 1/2 RESULT COMMENT: NORMAL
LDLC SERPL CALC-MCNC: 86.6 MG/DL (ref 0–100)
MCH RBC QN AUTO: 32.2 PG (ref 26–34)
MCHC RBC AUTO-ENTMCNC: 32 G/DL (ref 30–36.5)
MCV RBC AUTO: 100.5 FL (ref 80–99)
NRBC # BLD: 0 K/UL (ref 0–0.01)
NRBC BLD-RTO: 0 PER 100 WBC
PLATELET # BLD AUTO: 223 K/UL (ref 150–400)
PMV BLD AUTO: 10 FL (ref 8.9–12.9)
POTASSIUM SERPL-SCNC: 4.2 MMOL/L (ref 3.5–5.1)
PROT SERPL-MCNC: 6.7 G/DL (ref 6.4–8.2)
RBC # BLD AUTO: 4.04 M/UL (ref 3.8–5.2)
SODIUM SERPL-SCNC: 138 MMOL/L (ref 136–145)
TRIGL SERPL-MCNC: 67 MG/DL
VLDLC SERPL CALC-MCNC: 13.4 MG/DL
WBC # BLD AUTO: 4.5 K/UL (ref 3.6–11)

## 2025-06-18 ENCOUNTER — RESULTS FOLLOW-UP (OUTPATIENT)
Age: 45
End: 2025-06-18

## 2025-06-18 LAB
HCV AB SERPL QL IA: NORMAL
HCV IGG SERPL QL IA: NON REACTIVE S/CO RATIO

## 2025-08-29 ENCOUNTER — TRANSCRIBE ORDERS (OUTPATIENT)
Facility: HOSPITAL | Age: 45
End: 2025-08-29

## 2025-08-29 DIAGNOSIS — R22.32 LOCALIZED SWELLING, MASS AND LUMP, UPPER LIMB, LEFT: ICD-10-CM

## 2025-08-29 DIAGNOSIS — Z01.89 ENCOUNTER FOR OTHER SPECIFIED SPECIAL EXAMINATIONS: ICD-10-CM

## 2025-08-29 DIAGNOSIS — C50.411 MALIGNANT NEOPLASM OF UPPER-OUTER QUADRANT OF RIGHT FEMALE BREAST, UNSPECIFIED ESTROGEN RECEPTOR STATUS (HCC): Primary | ICD-10-CM

## 2025-08-29 DIAGNOSIS — E04.1 NONTOXIC SINGLE THYROID NODULE: ICD-10-CM

## 2025-08-29 DIAGNOSIS — N64.4 MASTODYNIA: ICD-10-CM

## (undated) DEVICE — DERMABOND SKIN ADH 0.7ML -- DERMABOND ADVANCED 12/BX

## (undated) DEVICE — SEALER TISS L14CM DIA5MM ADV BPLR CRV TIP OPN APPRCH ENSEAL

## (undated) DEVICE — DRAPE,CHEST,FENES,15X10,STERIL: Brand: MEDLINE

## (undated) DEVICE — Device

## (undated) DEVICE — SENSOR TEMP SKIN DISP

## (undated) DEVICE — REM POLYHESIVE ADULT PATIENT RETURN ELECTRODE: Brand: VALLEYLAB

## (undated) DEVICE — OCCLUSIVE GAUZE STRIP,3% BISMUTH TRIBROMOPHENATE IN PETROLATUM BLEND: Brand: XEROFORM

## (undated) DEVICE — CURVED, SMALL JAW, OPEN SEALER/DIVIDER: Brand: LIGASURE

## (undated) DEVICE — PROTECTOR BRST IMPL W2XL3.5IN + MINUS 0.125IN RET SHLD BRST

## (undated) DEVICE — INSULATED BLADE ELECTRODE: Brand: EDGE

## (undated) DEVICE — GRADUATED BOWL: Brand: DEVON

## (undated) DEVICE — DRAIN SURG 15FR L3/16IN DIA4.7MM SIL CHN RND FULL FLUT TRCR

## (undated) DEVICE — (D)PREP SKN CHLRAPRP APPL 26ML -- CONVERT TO ITEM 371833

## (undated) DEVICE — Z DISCONTINUED NO SUB IDED GLOVE SURG SZ 6 L12IN FNGR THK13MIL WHT ISOLEX POLYISOPRENE

## (undated) DEVICE — BASIC PACK: Brand: CONVERTORS

## (undated) DEVICE — SUTURE PROL SZ 3-0 L18IN NONABSORBABLE BLU L19MM PC-5 3/8 8632G

## (undated) DEVICE — SURGICAL PROCEDURE PACK BASIN MAJ SET CUST NO CAUT

## (undated) DEVICE — TOWEL SURG W17XL27IN STD BLU COT NONFENESTRATED PREWASHED

## (undated) DEVICE — ASTOUND STANDARD SURGICAL GOWN, XL: Brand: CONVERTORS

## (undated) DEVICE — INFECTION CONTROL KIT SYS

## (undated) DEVICE — STERILE POLYISOPRENE POWDER-FREE SURGICAL GLOVES WITH EMOLLIENT COATING: Brand: PROTEXIS

## (undated) DEVICE — SYR 10ML CTRL LR LCK NSAF LF --

## (undated) DEVICE — WRAP SURG W1.31XL1.34M CARD FOR PT 165-172CM THERMOWRP

## (undated) DEVICE — PREP SKN CHLRAPRP APL 26ML STR --

## (undated) DEVICE — GAUZE SPONGES,12 PLY: Brand: CURITY

## (undated) DEVICE — TRAY PREP DRY W/ PREM GLV 2 APPL 6 SPNG 2 UNDPD 1 OVERWRAP

## (undated) DEVICE — SMOKE EVACUATION PENCIL: Brand: VALLEYLAB

## (undated) DEVICE — SOLUTION IV 500ML 0.9% SOD CHL FLX CONT

## (undated) DEVICE — 3M(TM) TRANSPORE SURGICAL TAPE 1527-1: Brand: 3M™ TRANSPORE™

## (undated) DEVICE — SUTURE PDS II SZ 3-0 L27IN ABSRB VLT L26MM SH 1/2 CIR Z316H

## (undated) DEVICE — SUTURE MCRYL SZ 4-0 L27IN ABSRB UD L19MM PS-2 1/2 CIR PRIM Y426H

## (undated) DEVICE — APPLICATOR BNDG 1MM ADH PREMIERPRO EXOFIN

## (undated) DEVICE — SUT SLK 2-0SH 30IN BLK --

## (undated) DEVICE — SOLUTION IV 1000ML 0.9% SOD CHL

## (undated) DEVICE — SUT PROL 3-0 36IN SH DA BLU --

## (undated) DEVICE — INSULATED BLADE ELECTRODE;CAUTION: FOR MANUFACTURING, PROCESSING, OR REPACKING.: Brand: EDGE

## (undated) DEVICE — SIMPLICITY FLUFF UNDERPAD 23X36, MODERATE: Brand: SIMPLICITY

## (undated) DEVICE — SUTURE MCRYL SZ 3-0 L27IN ABSRB UD L24MM PS-1 3/8 CIR PRIM Y936H

## (undated) DEVICE — MEDI-VAC NON-CONDUCTIVE SUCTION TUBING: Brand: CARDINAL HEALTH

## (undated) DEVICE — SUTURE VCRL SZ 3-0 L27IN ABSRB UD L26MM SH 1/2 CIR J416H

## (undated) DEVICE — SUTURE MCRYL SZ 3-0 L27IN ABSRB UD L19MM PS-2 3/8 CIR PRIM Y427H

## (undated) DEVICE — INTENDED FOR TISSUE SEPARATION, AND OTHER PROCEDURES THAT REQUIRE A SHARP SURGICAL BLADE TO PUNCTURE OR CUT.: Brand: BARD-PARKER ® CARBON RIB-BACK BLADES

## (undated) DEVICE — DRAPE,REIN 53X77,STERILE: Brand: MEDLINE

## (undated) DEVICE — ROCKER SWITCH PENCIL BLADE ELECTRODE, HOLSTER: Brand: EDGE

## (undated) DEVICE — NEEDLE HYPO 25GA L1.5IN BVL ORIENTED ECLIPSE

## (undated) DEVICE — TRAY CATH OD16FR SIL URIN M STATLOK STBL DEV SURSTP

## (undated) DEVICE — KIT TISS EXP W/ 60ML SYR 122CM TRNSF SET PIERCING DEV L25CM

## (undated) DEVICE — DRAPE,LAPAROTOMY,T,PEDI,STERILE: Brand: MEDLINE

## (undated) DEVICE — PLASMABLADE PS210-030S 3.0S LOCK: Brand: PLASMABLADE™

## (undated) DEVICE — SLIM BODY SKIN STAPLER: Brand: APPOSE ULC

## (undated) DEVICE — 1200 GUARD II KIT W/5MM TUBE W/O VAC TUBE: Brand: GUARDIAN

## (undated) DEVICE — ABDOMINAL PAD: Brand: DERMACEA

## (undated) DEVICE — SYR 10ML LUER LOK 1/5ML GRAD --

## (undated) DEVICE — NEEDLE HYPO 25GA L1.5IN BLU POLYPR HUB S STL REG BVL STR

## (undated) DEVICE — SUTURE PERMAHAND SZ 2-0 L30IN NONABSORBABLE BLK SILK W/O A305H

## (undated) DEVICE — KENDALL SCD EXPRESS SLEEVES, KNEE LENGTH, MEDIUM: Brand: KENDALL SCD

## (undated) DEVICE — DRAPE PRB US TRNSDCR 6X96IN --

## (undated) DEVICE — STERILE NEOPRENE POWDER-FREE SURGICAL GLOVES WITH NITRILE COATING: Brand: PROTEXIS

## (undated) DEVICE — COLLECTION SET 21GA 1.25IN --

## (undated) DEVICE — BRA SURG POST-OP LG 42IN --

## (undated) DEVICE — MASTISOL ADHESIVE LIQ 2/3ML

## (undated) DEVICE — COVER US PRB W12XL244CM FLD IORT STR TIP

## (undated) DEVICE — SPONGE LAP 18X18IN STRL -- 5/PK

## (undated) DEVICE — SYSTEM IMPL DEL FOR BRST IMPL FUN (SEE COMMENT)

## (undated) DEVICE — SKIN MARKER,REGULAR TIP WITH RULER AND LABELS: Brand: DEVON

## (undated) DEVICE — BRA SURG POST-OP XL 46IN --

## (undated) DEVICE — SOL IRR SOD CL 0.9% 1000ML BTL --

## (undated) DEVICE — NEEDLE HYPO 22GA L1.5IN BLK S STL HUB POLYPR SHLD REG BVL

## (undated) DEVICE — HANDLE LT SNAP ON ULT DURABLE LENS FOR TRUMPF ALC DISPOSABLE

## (undated) DEVICE — NEEDLE HYPO 22GA L1.5IN BLK POLYPR HUB S STL REG BVL STR

## (undated) DEVICE — DRAPE XR C ARM 41X74IN LF --